# Patient Record
Sex: FEMALE | Race: WHITE | NOT HISPANIC OR LATINO | Employment: FULL TIME | URBAN - METROPOLITAN AREA
[De-identification: names, ages, dates, MRNs, and addresses within clinical notes are randomized per-mention and may not be internally consistent; named-entity substitution may affect disease eponyms.]

---

## 2017-02-06 ENCOUNTER — HOSPITAL ENCOUNTER (EMERGENCY)
Facility: HOSPITAL | Age: 27
Discharge: HOME | End: 2017-02-06
Attending: EMERGENCY MEDICINE | Admitting: EMERGENCY MEDICINE

## 2017-02-06 VITALS
OXYGEN SATURATION: 96 % | SYSTOLIC BLOOD PRESSURE: 130 MMHG | WEIGHT: 120 LBS | TEMPERATURE: 98.4 F | HEART RATE: 111 BPM | HEIGHT: 63 IN | RESPIRATION RATE: 20 BRPM | BODY MASS INDEX: 21.26 KG/M2 | DIASTOLIC BLOOD PRESSURE: 73 MMHG

## 2017-02-06 DIAGNOSIS — R21 RASH: Primary | ICD-10-CM

## 2017-02-06 PROCEDURE — 99283 EMERGENCY DEPT VISIT LOW MDM: CPT
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
## 2017-02-06 NOTE — DISCHARGE INSTRUCTIONS
Follow up with one of the [facility] physicians below to setup primary care.    (Dr. Sifuentes, Dr. Gonzalez, Dr. Meyer, and Dr. Hughes.)  [facility], Primary Care, [phone], [address], [place] [address]    [facility], Primary Care, [phone], [address] [place], [address] [place]     ([place]) [facility], Primary Care, [phone], [address], [address] [place], [address] [place]     ([facility]) [facility], Primary Care, [phone], [address], [address] [place], [address]     [place] [address] [facility], Primary Care, [phone], [address] [place], [address]    [place] [address] [facility], Primary Care, [phone], [address] [place]    [place], [address] [facility], Primary Care, [phone], [address], [address] [place], [address] [place]    ([facility], Primary Care, [phone], [address] [place], [address] [facility]) [facility], Primary Care, [phone], [address] [place], [address] [facility], Primary Care, [phone], [address], [address] [place], [address] [facility], Primary Care, [phone], [address], [place], [address]     [facility], Primary Care, [phone], [address] [place] [address] [place], [address]

## 2017-11-28 ENCOUNTER — HOSPITAL ENCOUNTER (EMERGENCY)
Facility: HOSPITAL | Age: 27
Discharge: HOME | End: 2017-11-28
Attending: EMERGENCY MEDICINE | Admitting: EMERGENCY MEDICINE

## 2017-11-28 ENCOUNTER — APPOINTMENT (OUTPATIENT)
Dept: GENERAL RADIOLOGY | Facility: HOSPITAL | Age: 27
End: 2017-11-28

## 2017-11-28 VITALS
TEMPERATURE: 98.2 F | RESPIRATION RATE: 16 BRPM | DIASTOLIC BLOOD PRESSURE: 76 MMHG | WEIGHT: 162 LBS | SYSTOLIC BLOOD PRESSURE: 116 MMHG | HEIGHT: 63 IN | OXYGEN SATURATION: 100 % | HEART RATE: 95 BPM | BODY MASS INDEX: 28.7 KG/M2

## 2017-11-28 DIAGNOSIS — S86.912A KNEE STRAIN, LEFT, INITIAL ENCOUNTER: Primary | ICD-10-CM

## 2017-11-28 PROCEDURE — 73562 X-RAY EXAM OF KNEE 3: CPT

## 2017-11-28 PROCEDURE — 99283 EMERGENCY DEPT VISIT LOW MDM: CPT

## 2017-11-28 RX ORDER — ACETAMINOPHEN 325 MG/1
650 TABLET ORAL ONCE
Status: COMPLETED | OUTPATIENT
Start: 2017-11-28 | End: 2017-11-28

## 2017-11-28 RX ORDER — IBUPROFEN 600 MG/1
600 TABLET ORAL EVERY 8 HOURS PRN
Qty: 12 TABLET | Refills: 0 | Status: SHIPPED | OUTPATIENT
Start: 2017-11-28

## 2017-11-28 RX ORDER — SENNOSIDES 8.6 MG
650 CAPSULE ORAL EVERY 8 HOURS PRN
Qty: 12 TABLET | Refills: 0 | Status: SHIPPED | OUTPATIENT
Start: 2017-11-28 | End: 2021-12-29

## 2017-11-28 RX ADMIN — ACETAMINOPHEN 650 MG: 325 TABLET, FILM COATED ORAL at 11:22

## 2020-01-27 ENCOUNTER — APPOINTMENT (OUTPATIENT)
Dept: GENERAL RADIOLOGY | Facility: HOSPITAL | Age: 30
End: 2020-01-27

## 2020-01-27 ENCOUNTER — APPOINTMENT (OUTPATIENT)
Dept: CT IMAGING | Facility: HOSPITAL | Age: 30
End: 2020-01-27

## 2020-01-27 ENCOUNTER — HOSPITAL ENCOUNTER (EMERGENCY)
Facility: HOSPITAL | Age: 30
Discharge: HOME | End: 2020-01-27
Attending: EMERGENCY MEDICINE | Admitting: EMERGENCY MEDICINE

## 2020-01-27 ENCOUNTER — APPOINTMENT (OUTPATIENT)
Dept: CARDIOLOGY | Facility: HOSPITAL | Age: 30
End: 2020-01-27

## 2020-01-27 VITALS
HEIGHT: 64 IN | SYSTOLIC BLOOD PRESSURE: 111 MMHG | DIASTOLIC BLOOD PRESSURE: 74 MMHG | RESPIRATION RATE: 16 BRPM | TEMPERATURE: 99.2 F | HEART RATE: 70 BPM | BODY MASS INDEX: 30.73 KG/M2 | WEIGHT: 180 LBS | OXYGEN SATURATION: 99 %

## 2020-01-27 DIAGNOSIS — M79.605 LEFT LEG PAIN: ICD-10-CM

## 2020-01-27 DIAGNOSIS — R91.1 PULMONARY NODULE: ICD-10-CM

## 2020-01-27 DIAGNOSIS — R07.9 ACUTE CHEST PAIN: Primary | ICD-10-CM

## 2020-01-27 LAB
ALBUMIN SERPL-MCNC: 4.1 G/DL (ref 3.5–5.2)
ALBUMIN/GLOB SERPL: 1.2 G/DL
ALP SERPL-CCNC: 53 U/L (ref 39–117)
ALT SERPL W P-5'-P-CCNC: 9 U/L (ref 1–33)
ANION GAP SERPL CALCULATED.3IONS-SCNC: 12 MMOL/L (ref 5–15)
AST SERPL-CCNC: 10 U/L (ref 1–32)
B-HCG UR QL: NEGATIVE
BASOPHILS # BLD AUTO: 0.06 10*3/MM3 (ref 0–0.2)
BASOPHILS NFR BLD AUTO: 0.8 % (ref 0–1.5)
BH CV ECHO MEAS - BSA(HAYCOCK): 1.9 M^2
BH CV ECHO MEAS - BSA: 1.9 M^2
BH CV ECHO MEAS - BZI_BMI: 30.9 KILOGRAMS/M^2
BH CV ECHO MEAS - BZI_METRIC_HEIGHT: 162.6 CM
BH CV ECHO MEAS - BZI_METRIC_WEIGHT: 81.6 KG
BH CV LOWER VASCULAR LEFT COMMON FEMORAL AUGMENT: NORMAL
BH CV LOWER VASCULAR LEFT COMMON FEMORAL COMPRESS: NORMAL
BH CV LOWER VASCULAR LEFT COMMON FEMORAL PHASIC: NORMAL
BH CV LOWER VASCULAR LEFT COMMON FEMORAL SPONT: NORMAL
BH CV LOWER VASCULAR LEFT DISTAL FEMORAL COMPRESS: NORMAL
BH CV LOWER VASCULAR LEFT GASTRONEMIUS COMPRESS: NORMAL
BH CV LOWER VASCULAR LEFT GREATER SAPH AK COMPRESS: NORMAL
BH CV LOWER VASCULAR LEFT LESSER SAPH COMPRESS: NORMAL
BH CV LOWER VASCULAR LEFT MID FEMORAL AUGMENT: NORMAL
BH CV LOWER VASCULAR LEFT MID FEMORAL COMPRESS: NORMAL
BH CV LOWER VASCULAR LEFT MID FEMORAL PHASIC: NORMAL
BH CV LOWER VASCULAR LEFT MID FEMORAL SPONT: NORMAL
BH CV LOWER VASCULAR LEFT PERONEAL COMPRESS: NORMAL
BH CV LOWER VASCULAR LEFT POPLITEAL AUGMENT: NORMAL
BH CV LOWER VASCULAR LEFT POPLITEAL COMPRESS: NORMAL
BH CV LOWER VASCULAR LEFT POPLITEAL PHASIC: NORMAL
BH CV LOWER VASCULAR LEFT POPLITEAL SPONT: NORMAL
BH CV LOWER VASCULAR LEFT POSTERIOR TIBIAL COMPRESS: NORMAL
BH CV LOWER VASCULAR LEFT PROFUNDA FEMORAL AUGMENT: NORMAL
BH CV LOWER VASCULAR LEFT PROFUNDA FEMORAL COMPRESS: NORMAL
BH CV LOWER VASCULAR LEFT PROFUNDA FEMORAL PHASIC: NORMAL
BH CV LOWER VASCULAR LEFT PROFUNDA FEMORAL SPONT: NORMAL
BH CV LOWER VASCULAR LEFT PROXIMAL FEMORAL COMPRESS: NORMAL
BH CV LOWER VASCULAR LEFT SAPHENOFEMORAL JUNCTION AUGMENT: NORMAL
BH CV LOWER VASCULAR LEFT SAPHENOFEMORAL JUNCTION COMPRESS: NORMAL
BH CV LOWER VASCULAR LEFT SAPHENOFEMORAL JUNCTION PHASIC: NORMAL
BH CV LOWER VASCULAR LEFT SAPHENOFEMORAL JUNCTION SPONT: NORMAL
BH CV LOWER VASCULAR RIGHT COMMON FEMORAL AUGMENT: NORMAL
BH CV LOWER VASCULAR RIGHT COMMON FEMORAL COMPRESS: NORMAL
BH CV LOWER VASCULAR RIGHT COMMON FEMORAL PHASIC: NORMAL
BH CV LOWER VASCULAR RIGHT COMMON FEMORAL SPONT: NORMAL
BILIRUB SERPL-MCNC: 0.2 MG/DL (ref 0.2–1.2)
BILIRUB UR QL STRIP: NEGATIVE
BUN BLD-MCNC: 16 MG/DL (ref 6–20)
BUN/CREAT SERPL: 27.1 (ref 7–25)
CALCIUM SPEC-SCNC: 9.3 MG/DL (ref 8.6–10.5)
CHLORIDE SERPL-SCNC: 104 MMOL/L (ref 98–107)
CLARITY UR: CLEAR
CO2 SERPL-SCNC: 23 MMOL/L (ref 22–29)
COLOR UR: YELLOW
CREAT BLD-MCNC: 0.59 MG/DL (ref 0.57–1)
DEPRECATED RDW RBC AUTO: 38.8 FL (ref 37–54)
EOSINOPHIL # BLD AUTO: 0.09 10*3/MM3 (ref 0–0.4)
EOSINOPHIL NFR BLD AUTO: 1.3 % (ref 0.3–6.2)
ERYTHROCYTE [DISTWIDTH] IN BLOOD BY AUTOMATED COUNT: 11.8 % (ref 12.3–15.4)
GFR SERPL CREATININE-BSD FRML MDRD: 121 ML/MIN/1.73
GLOBULIN UR ELPH-MCNC: 3.3 GM/DL
GLUCOSE BLD-MCNC: 115 MG/DL (ref 65–99)
GLUCOSE UR STRIP-MCNC: NEGATIVE MG/DL
HCT VFR BLD AUTO: 38.3 % (ref 34–46.6)
HGB BLD-MCNC: 12.7 G/DL (ref 12–15.9)
HGB UR QL STRIP.AUTO: NEGATIVE
HOLD SPECIMEN: NORMAL
HOLD SPECIMEN: NORMAL
IMM GRANULOCYTES # BLD AUTO: 0.02 10*3/MM3 (ref 0–0.05)
IMM GRANULOCYTES NFR BLD AUTO: 0.3 % (ref 0–0.5)
INTERNAL NEGATIVE CONTROL: NEGATIVE
INTERNAL POSITIVE CONTROL: POSITIVE
KETONES UR QL STRIP: NEGATIVE
LEUKOCYTE ESTERASE UR QL STRIP.AUTO: NEGATIVE
LYMPHOCYTES # BLD AUTO: 2.45 10*3/MM3 (ref 0.7–3.1)
LYMPHOCYTES NFR BLD AUTO: 34.7 % (ref 19.6–45.3)
Lab: NORMAL
MCH RBC QN AUTO: 30 PG (ref 26.6–33)
MCHC RBC AUTO-ENTMCNC: 33.2 G/DL (ref 31.5–35.7)
MCV RBC AUTO: 90.5 FL (ref 79–97)
MONOCYTES # BLD AUTO: 0.36 10*3/MM3 (ref 0.1–0.9)
MONOCYTES NFR BLD AUTO: 5.1 % (ref 5–12)
NEUTROPHILS # BLD AUTO: 4.09 10*3/MM3 (ref 1.7–7)
NEUTROPHILS NFR BLD AUTO: 57.8 % (ref 42.7–76)
NITRITE UR QL STRIP: NEGATIVE
NRBC BLD AUTO-RTO: 0 /100 WBC (ref 0–0.2)
PH UR STRIP.AUTO: 5.5 [PH] (ref 5–8)
PLATELET # BLD AUTO: 275 10*3/MM3 (ref 140–450)
PMV BLD AUTO: 10.1 FL (ref 6–12)
POTASSIUM BLD-SCNC: 4.2 MMOL/L (ref 3.5–5.2)
PROT SERPL-MCNC: 7.4 G/DL (ref 6–8.5)
PROT UR QL STRIP: NEGATIVE
RBC # BLD AUTO: 4.23 10*6/MM3 (ref 3.77–5.28)
SODIUM BLD-SCNC: 139 MMOL/L (ref 136–145)
SP GR UR STRIP: 1.04 (ref 1–1.03)
TROPONIN T SERPL-MCNC: <0.01 NG/ML (ref 0–0.03)
TROPONIN T SERPL-MCNC: <0.01 NG/ML (ref 0–0.03)
UROBILINOGEN UR QL STRIP: ABNORMAL
WBC NRBC COR # BLD: 7.07 10*3/MM3 (ref 3.4–10.8)
WHOLE BLOOD HOLD SPECIMEN: NORMAL
WHOLE BLOOD HOLD SPECIMEN: NORMAL

## 2020-01-27 PROCEDURE — 99284 EMERGENCY DEPT VISIT MOD MDM: CPT

## 2020-01-27 PROCEDURE — 80053 COMPREHEN METABOLIC PANEL: CPT | Performed by: EMERGENCY MEDICINE

## 2020-01-27 PROCEDURE — 93971 EXTREMITY STUDY: CPT

## 2020-01-27 PROCEDURE — 85025 COMPLETE CBC W/AUTO DIFF WBC: CPT

## 2020-01-27 PROCEDURE — 93971 EXTREMITY STUDY: CPT | Performed by: INTERNAL MEDICINE

## 2020-01-27 PROCEDURE — 81025 URINE PREGNANCY TEST: CPT | Performed by: NURSE PRACTITIONER

## 2020-01-27 PROCEDURE — 84484 ASSAY OF TROPONIN QUANT: CPT | Performed by: NURSE PRACTITIONER

## 2020-01-27 PROCEDURE — 93005 ELECTROCARDIOGRAM TRACING: CPT

## 2020-01-27 PROCEDURE — 81003 URINALYSIS AUTO W/O SCOPE: CPT | Performed by: NURSE PRACTITIONER

## 2020-01-27 PROCEDURE — 93005 ELECTROCARDIOGRAM TRACING: CPT | Performed by: NURSE PRACTITIONER

## 2020-01-27 PROCEDURE — 73560 X-RAY EXAM OF KNEE 1 OR 2: CPT

## 2020-01-27 PROCEDURE — 84484 ASSAY OF TROPONIN QUANT: CPT | Performed by: EMERGENCY MEDICINE

## 2020-01-27 PROCEDURE — 93005 ELECTROCARDIOGRAM TRACING: CPT | Performed by: EMERGENCY MEDICINE

## 2020-01-27 PROCEDURE — 71275 CT ANGIOGRAPHY CHEST: CPT

## 2020-01-27 PROCEDURE — 0 IOPAMIDOL PER 1 ML: Performed by: EMERGENCY MEDICINE

## 2020-01-27 RX ORDER — NORGESTIMATE AND ETHINYL ESTRADIOL 0.25-0.035
1 KIT ORAL DAILY
COMMUNITY
End: 2021-12-29

## 2020-01-27 RX ORDER — ALUMINA, MAGNESIA, AND SIMETHICONE 2400; 2400; 240 MG/30ML; MG/30ML; MG/30ML
15 SUSPENSION ORAL ONCE
Status: COMPLETED | OUTPATIENT
Start: 2020-01-27 | End: 2020-01-27

## 2020-01-27 RX ORDER — SODIUM CHLORIDE 0.9 % (FLUSH) 0.9 %
10 SYRINGE (ML) INJECTION AS NEEDED
Status: DISCONTINUED | OUTPATIENT
Start: 2020-01-27 | End: 2020-01-27 | Stop reason: HOSPADM

## 2020-01-27 RX ORDER — LIDOCAINE HYDROCHLORIDE 20 MG/ML
15 SOLUTION OROPHARYNGEAL ONCE
Status: COMPLETED | OUTPATIENT
Start: 2020-01-27 | End: 2020-01-27

## 2020-01-27 RX ADMIN — SODIUM CHLORIDE, PRESERVATIVE FREE 10 ML: 5 INJECTION INTRAVENOUS at 10:42

## 2020-01-27 RX ADMIN — ALUMINUM HYDROXIDE, MAGNESIUM HYDROXIDE, AND DIMETHICONE 15 ML: 400; 400; 40 SUSPENSION ORAL at 15:19

## 2020-01-27 RX ADMIN — IOPAMIDOL 75 ML: 755 INJECTION, SOLUTION INTRAVENOUS at 12:39

## 2020-01-27 RX ADMIN — LIDOCAINE HYDROCHLORIDE 15 ML: 20 SOLUTION ORAL; TOPICAL at 15:19
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
## 2020-01-27 NOTE — DISCHARGE INSTRUCTIONS
Follow up with one of the [facility] Primary Care Providers below to setup primary care. If you need assistance coordinating a primary care appointment with a [facility] Primary Care Provider, please contact the Primary Care Coordinators at [phone] for appointment scheduling.    [facility], Primary Care   [address], [address]   [place], [place] [address] [phone]    [facility] Internal Medicine & Endocrinology  [address]  [place] [address] [phone]    [facility] Family Medicine  [address]   [place], [place] [address] [phone]    [facility] Primary Care  [address]  [place], [place] [address] [phone]    [facility], Primary Care,   [address]   [place], [place] [address] [phone]    [facility] Primary Care  [address]  [place], [place] [address] [phone]    [facility], Primary Care  [address], [address]  [place], [place] [address] [phone]    [facility] Internal Medicine & Pediatrics  [address]   [place] [address] [phone]    [facility], Primary Care  [address] [place] [address] [phone]      [facility] Primary Care  [address], [address]   [place] [address] [phone]    [facility] Family [facility]  [address] [place] [address] [phone]    Follow up with one of the physician centers below to setup primary care.    [facility], [phone], [address], [address], [place], [facility]    [facility], [phone], [address]  [address]    [facility], [phone], [address] [place] [address];     [facility], [phone], [address] [place] [address]

## 2020-01-30 ENCOUNTER — HOSPITAL ENCOUNTER (OUTPATIENT)
Dept: CARDIOLOGY | Facility: HOSPITAL | Age: 30
Discharge: HOME | End: 2020-01-30
Admitting: NURSE PRACTITIONER

## 2020-01-30 ENCOUNTER — OFFICE VISIT (OUTPATIENT)
Dept: CARDIOLOGY | Facility: HOSPITAL | Age: 30
End: 2020-01-30

## 2020-01-30 VITALS
OXYGEN SATURATION: 98 % | WEIGHT: 200.25 LBS | HEART RATE: 76 BPM | HEIGHT: 64 IN | RESPIRATION RATE: 19 BRPM | DIASTOLIC BLOOD PRESSURE: 70 MMHG | BODY MASS INDEX: 34.19 KG/M2 | SYSTOLIC BLOOD PRESSURE: 116 MMHG | TEMPERATURE: 97.6 F

## 2020-01-30 VITALS — BODY MASS INDEX: 34.15 KG/M2 | HEIGHT: 64 IN | WEIGHT: 200 LBS

## 2020-01-30 DIAGNOSIS — R06.09 DOE (DYSPNEA ON EXERTION): Primary | ICD-10-CM

## 2020-01-30 DIAGNOSIS — I31.39 PERICARDIAL EFFUSION: ICD-10-CM

## 2020-01-30 DIAGNOSIS — R06.09 DOE (DYSPNEA ON EXERTION): ICD-10-CM

## 2020-01-30 LAB
BH CV ECHO MEAS - AO ROOT AREA (BSA CORRECTED): 1.3
BH CV ECHO MEAS - AO ROOT AREA: 5 CM^2
BH CV ECHO MEAS - AO ROOT DIAM: 2.5 CM
BH CV ECHO MEAS - BSA(HAYCOCK): 2.1 M^2
BH CV ECHO MEAS - BSA: 2 M^2
BH CV ECHO MEAS - BZI_BMI: 34.3 KILOGRAMS/M^2
BH CV ECHO MEAS - BZI_METRIC_HEIGHT: 162.6 CM
BH CV ECHO MEAS - BZI_METRIC_WEIGHT: 90.7 KG
BH CV ECHO MEAS - EDV(CUBED): 61.9 ML
BH CV ECHO MEAS - EDV(MOD-SP2): 140 ML
BH CV ECHO MEAS - EDV(MOD-SP4): 134 ML
BH CV ECHO MEAS - EDV(TEICH): 68.2 ML
BH CV ECHO MEAS - EF(CUBED): 70.1 %
BH CV ECHO MEAS - EF(MOD-SP2): 53.6 %
BH CV ECHO MEAS - EF(MOD-SP4): 51.5 %
BH CV ECHO MEAS - EF(TEICH): 62.3 %
BH CV ECHO MEAS - ESV(CUBED): 18.5 ML
BH CV ECHO MEAS - ESV(MOD-SP2): 65 ML
BH CV ECHO MEAS - ESV(MOD-SP4): 65 ML
BH CV ECHO MEAS - ESV(TEICH): 25.7 ML
BH CV ECHO MEAS - FS: 33.1 %
BH CV ECHO MEAS - IVS/LVPW: 0.97
BH CV ECHO MEAS - IVSD: 1.1 CM
BH CV ECHO MEAS - LA DIMENSION: 3.8 CM
BH CV ECHO MEAS - LA/AO: 1.5
BH CV ECHO MEAS - LAD MAJOR: 4.6 CM
BH CV ECHO MEAS - LAT PEAK E' VEL: 12.3 CM/SEC
BH CV ECHO MEAS - LATERAL E/E' RATIO: 5.3
BH CV ECHO MEAS - LV DIASTOLIC VOL/BSA (35-75): 68.5 ML/M^2
BH CV ECHO MEAS - LV MASS(C)D: 137.4 GRAMS
BH CV ECHO MEAS - LV MASS(C)DI: 70.2 GRAMS/M^2
BH CV ECHO MEAS - LV MAX PG: 4.5 MMHG
BH CV ECHO MEAS - LV MEAN PG: 2.1 MMHG
BH CV ECHO MEAS - LV SYSTOLIC VOL/BSA (12-30): 33.2 ML/M^2
BH CV ECHO MEAS - LV V1 MAX: 105.6 CM/SEC
BH CV ECHO MEAS - LV V1 MEAN: 65.8 CM/SEC
BH CV ECHO MEAS - LV V1 VTI: 23.2 CM
BH CV ECHO MEAS - LVIDD: 4 CM
BH CV ECHO MEAS - LVIDS: 2.6 CM
BH CV ECHO MEAS - LVLD AP2: 9.7 CM
BH CV ECHO MEAS - LVLD AP4: 9.1 CM
BH CV ECHO MEAS - LVLS AP2: 8 CM
BH CV ECHO MEAS - LVLS AP4: 8.5 CM
BH CV ECHO MEAS - LVOT AREA (M): 3.1 CM^2
BH CV ECHO MEAS - LVOT AREA: 3 CM^2
BH CV ECHO MEAS - LVOT DIAM: 2 CM
BH CV ECHO MEAS - LVPWD: 1.1 CM
BH CV ECHO MEAS - MED PEAK E' VEL: 8.9 CM/SEC
BH CV ECHO MEAS - MEDIAL E/E' RATIO: 7.3
BH CV ECHO MEAS - MV A MAX VEL: 61.7 CM/SEC
BH CV ECHO MEAS - MV DEC TIME: 0.17 SEC
BH CV ECHO MEAS - MV E MAX VEL: 65.6 CM/SEC
BH CV ECHO MEAS - MV E/A: 1.1
BH CV ECHO MEAS - PA ACC SLOPE: 1018 CM/SEC^2
BH CV ECHO MEAS - PA ACC TIME: 0.07 SEC
BH CV ECHO MEAS - PA PR(ACCEL): 48.9 MMHG
BH CV ECHO MEAS - RVDD: 2.4 CM
BH CV ECHO MEAS - SI(CUBED): 22.2 ML/M^2
BH CV ECHO MEAS - SI(LVOT): 35.9 ML/M^2
BH CV ECHO MEAS - SI(MOD-SP2): 38.3 ML/M^2
BH CV ECHO MEAS - SI(MOD-SP4): 35.3 ML/M^2
BH CV ECHO MEAS - SI(TEICH): 21.7 ML/M^2
BH CV ECHO MEAS - SV(CUBED): 43.4 ML
BH CV ECHO MEAS - SV(LVOT): 70.3 ML
BH CV ECHO MEAS - SV(MOD-SP2): 75 ML
BH CV ECHO MEAS - SV(MOD-SP4): 69 ML
BH CV ECHO MEAS - SV(TEICH): 42.5 ML
BH CV ECHO MEAS - TAPSE (>1.6): 2.4 CM2
BH CV ECHO MEASUREMENTS AVERAGE E/E' RATIO: 6.19
BH CV VAS BP LEFT ARM: NORMAL MMHG
BH CV XLRA - RV BASE: 4 CM
BH CV XLRA - RV LENGTH: 6 CM
BH CV XLRA - RV MID: 2.8 CM
BH CV XLRA - TDI S': 13.6 CM/SEC
LEFT ATRIUM VOLUME INDEX: 17.4 ML/M^2
LEFT ATRIUM VOLUME: 34 ML
MAXIMAL PREDICTED HEART RATE: 191 BPM
STRESS TARGET HR: 162 BPM

## 2020-01-30 PROCEDURE — 93306 TTE W/DOPPLER COMPLETE: CPT | Performed by: INTERNAL MEDICINE

## 2020-01-30 PROCEDURE — 99203 OFFICE O/P NEW LOW 30 MIN: CPT | Performed by: NURSE PRACTITIONER

## 2020-01-30 PROCEDURE — 93306 TTE W/DOPPLER COMPLETE: CPT

## 2020-01-31 ENCOUNTER — TELEPHONE (OUTPATIENT)
Dept: CARDIOLOGY | Facility: HOSPITAL | Age: 30
End: 2020-01-31

## 2020-01-31 NOTE — TELEPHONE ENCOUNTER
Patient called inquiring about her Echo results. She does not get off work till 5:30 but states she will turn her ringer up.

## 2020-02-03 ENCOUNTER — TELEPHONE (OUTPATIENT)
Dept: CARDIOLOGY | Facility: HOSPITAL | Age: 30
End: 2020-02-03

## 2020-02-03 NOTE — TELEPHONE ENCOUNTER
Patient called in wanting to know results of echo.  Advised pt that Provider ELENITA Sharpe was out of the office today.  Spoke with Provider ELENITA Rosario and per her to relay to patient that the echo looked normal. Patient verbalized understanding and would like to speak with provider for the next step.  She stated the best time to reach her is before 9 am as she can not answer her phone during work hours.

## 2020-02-04 ENCOUNTER — TELEPHONE (OUTPATIENT)
Dept: CARDIOLOGY | Facility: HOSPITAL | Age: 30
End: 2020-02-04

## 2020-02-04 PROBLEM — R06.09 DOE (DYSPNEA ON EXERTION): Status: ACTIVE | Noted: 2020-02-04

## 2020-02-04 PROBLEM — I31.39 PERICARDIAL EFFUSION: Status: ACTIVE | Noted: 2020-02-04

## 2020-02-04 RX ORDER — COLCHICINE 0.6 MG/1
0.6 TABLET ORAL 2 TIMES DAILY
Qty: 28 TABLET | Refills: 0 | Status: SHIPPED | OUTPATIENT
Start: 2020-02-04 | End: 2021-12-29

## 2020-02-04 NOTE — TELEPHONE ENCOUNTER
Spoke with patient and reviewed echo. No pericardial fluid noted. Suspect pericarditis. Will begin colchine 0.6 mg bid for 2 weeks. Patient will receive a follow up call from [facility] on 2/7 to evaluate s/s.

## 2020-02-05 ENCOUNTER — DOCUMENTATION (OUTPATIENT)
Dept: CARDIOLOGY | Facility: HOSPITAL | Age: 30
End: 2020-02-05

## 2020-02-05 NOTE — PROGRESS NOTES
Patient called stated that she needed a prior authorization for colchicine 0.6 mg bid. Spoke to Rafaela (pharmacist) and [facility]     Called patient back told her she should be able to get the meds when she could. She had no further questions or concerns at this time.

## 2020-02-07 ENCOUNTER — TELEPHONE (OUTPATIENT)
Dept: CARDIOLOGY | Facility: HOSPITAL | Age: 30
End: 2020-02-07

## 2020-02-07 NOTE — TELEPHONE ENCOUNTER
Spoke with patient who notes that she took first dose of colchine today. Patient reports that she has not had chest pain in 2 days.

## 2020-02-18 RX ORDER — COLCHICINE 0.6 MG/1
TABLET ORAL
Qty: 28 TABLET | Refills: 0 | OUTPATIENT
Start: 2020-02-18

## 2020-08-11 ENCOUNTER — LAB REQUISITION (OUTPATIENT)
Dept: LAB | Facility: HOSPITAL | Age: 30
End: 2020-08-11

## 2020-08-11 DIAGNOSIS — Z00.00 ROUTINE GENERAL MEDICAL EXAMINATION AT A HEALTH CARE FACILITY: ICD-10-CM

## 2020-08-11 PROCEDURE — 86481 TB AG RESPONSE T-CELL SUSP: CPT

## 2020-08-28 LAB
HBV SURFACE AB SER RIA-ACNC: NORMAL
MEV IGG SER IA-ACNC: NORMAL
MUV IGG SER IA-ACNC: NORMAL
RUBV IGG SERPL IA-ACNC: NORMAL
TSPOT INTERPRETATION: NORMAL
TSPOT NIL CONTROL INTERPRETATION: NORMAL
TSPOT PANEL A: NORMAL
TSPOT PANEL B: NORMAL
TSPOT POS CONTROL INTERPRETATION: NORMAL
VZV IGG SER IA-ACNC: NORMAL

## 2021-12-29 ENCOUNTER — OFFICE VISIT (OUTPATIENT)
Dept: INTERNAL MEDICINE | Facility: CLINIC | Age: 31
End: 2021-12-29

## 2021-12-29 VITALS
DIASTOLIC BLOOD PRESSURE: 80 MMHG | HEIGHT: 64 IN | HEART RATE: 69 BPM | RESPIRATION RATE: 16 BRPM | OXYGEN SATURATION: 97 % | BODY MASS INDEX: 30.97 KG/M2 | SYSTOLIC BLOOD PRESSURE: 128 MMHG | TEMPERATURE: 97.6 F | WEIGHT: 181.4 LBS

## 2021-12-29 DIAGNOSIS — Z13.220 SCREENING, LIPID: ICD-10-CM

## 2021-12-29 DIAGNOSIS — Z13.29 SCREENING FOR ENDOCRINE DISORDER: ICD-10-CM

## 2021-12-29 DIAGNOSIS — R03.0 ELEVATED BP WITHOUT DIAGNOSIS OF HYPERTENSION: ICD-10-CM

## 2021-12-29 DIAGNOSIS — G89.29 CHRONIC PAIN OF LEFT KNEE: Primary | ICD-10-CM

## 2021-12-29 DIAGNOSIS — M25.562 CHRONIC PAIN OF LEFT KNEE: Primary | ICD-10-CM

## 2021-12-29 PROCEDURE — 99214 OFFICE O/P EST MOD 30 MIN: CPT | Performed by: PHYSICIAN ASSISTANT

## 2021-12-29 RX ORDER — NICOTINE POLACRILEX 2 MG
1 GUM BUCCAL DAILY
COMMUNITY

## 2021-12-29 RX ORDER — ACETAMINOPHEN AND CODEINE PHOSPHATE 120; 12 MG/5ML; MG/5ML
0.35 SOLUTION ORAL DAILY
COMMUNITY

## 2021-12-29 RX ORDER — MELATONIN
1000 DAILY
COMMUNITY
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
## 2022-03-09 ENCOUNTER — OFFICE VISIT (OUTPATIENT)
Dept: ORTHOPEDIC SURGERY | Facility: CLINIC | Age: 32
End: 2022-03-09

## 2022-03-09 VITALS
HEIGHT: 64 IN | WEIGHT: 180 LBS | BODY MASS INDEX: 30.73 KG/M2 | DIASTOLIC BLOOD PRESSURE: 82 MMHG | SYSTOLIC BLOOD PRESSURE: 122 MMHG

## 2022-03-09 DIAGNOSIS — M17.32 POST-TRAUMATIC OSTEOARTHRITIS OF LEFT KNEE: Primary | ICD-10-CM

## 2022-03-09 PROCEDURE — 99203 OFFICE O/P NEW LOW 30 MIN: CPT | Performed by: ORTHOPAEDIC SURGERY

## 2022-03-09 NOTE — PROGRESS NOTES
[facility] Note    Subjective     Chief Complaint   Patient presents with   • Left Knee - Pain        HPI    Louise Colon is a 31 y.o. female who presents with new problem of: left knee pain.  Onset: . The issue has been ongoing for 8 year(s). Pain is a 5/10 on the pain scale. Pain is described as aching. Associated symptoms include swelling and popping. The pain is worse with walking, standing and climbing stairs; resting improve the pain. Previous treatments have included: bracing, NSAIDS and weight loss.  History of PCL reconstruction and MCL repair in 2014, she cannot remember who did the surgery, but was here in [place].  Intermittent fullness sensation in the posterior aspect of her knee, not currently present.    I have reviewed the following portions of the patient's history and agree with: History of Present Illness and Review of Systems    Patient Active Problem List   Diagnosis   • FUNG (dyspnea on exertion)   • Pericardial effusion   • Elevated BP without diagnosis of hypertension   • Chronic pain of left knee     Past Medical History:   Diagnosis Date   • Anxiety    • Arthritis    • Depression    • Headache       Past Surgical History:   Procedure Laterality Date   • FEMUR FRACTURE SURGERY     • KNEE SURGERY     • MANDIBLE SURGERY        Family History   Problem Relation Age of Onset   • No Known Problems Mother    • No Known Problems Father    • No Known Problems Sister    • No Known Problems Brother    • Diabetes Maternal Grandmother    • Hypertension Maternal Grandmother    • Diabetes Maternal Grandfather    • Hypertension Maternal Grandfather    • Hypertension Paternal Grandmother    • Hypertension Paternal Grandfather      Social History     Socioeconomic History   • Marital status: Single   Tobacco Use   • Smoking status: Former Smoker     Packs/day: 0.25     Types: Cigarettes   • Smokeless tobacco: Never Used   • Tobacco comment: (12/29/2021) Quit date of 4/27/2021  "  Vaping Use   • Vaping Use: Never used   Substance and Sexual Activity   • Alcohol use: No   • Drug use: Not Currently     Types: Cocaine(coke), Marijuana     Comment: previous heroin use; pt reports hx of   • Sexual activity: Defer      Current Outpatient Medications on File Prior to Visit   Medication Sig Dispense Refill   • Biotin 1 MG capsule Take 1 mg by mouth Daily.     • cholecalciferol (VITAMIN D3) 25 MCG (1000 UT) tablet Take 1,000 Units by mouth Daily.     • ibuprofen (ADVIL,MOTRIN) 600 MG tablet Take 1 tablet by mouth Every 8 (Eight) Hours As Needed for Mild Pain . 12 tablet 0   • norethindrone (MICRONOR) 0.35 MG tablet Take 0.35 mg by mouth Daily.     • Zinc 50 MG capsule Take  by mouth.       No current facility-administered medications on file prior to visit.      No Known Allergies     Review of Systems   Constitutional: Negative.    Eyes: Negative.    Respiratory: Negative.    Cardiovascular: Positive for leg swelling.   Gastrointestinal: Negative.    Endocrine: Negative.    Genitourinary: Negative.    Musculoskeletal: Positive for arthralgias, back pain and neck pain.   Skin: Negative.    Allergic/Immunologic: Negative.    Neurological: Positive for headaches.   Hematological: Negative.    Psychiatric/Behavioral: Negative.         Objective      Physical Exam  /82   Ht 162.6 cm (64\")   Wt 81.6 kg (180 lb)   BMI 30.90 kg/m²     Body mass index is 30.9 kg/m².    General:   Mental Status:  Alert   Appearance: Cooperative, in no acute distress   Build and Nutrition: Well-nourished well-developed female   Orientation: Alert and oriented to person, place and time   Posture: Normal   Gait: Nonantalgic    Integument:   Left knee: Old medial incision well-healed    Neurologic:   Sensation:    Left foot: Intact to light touch on the dorsal and plantar aspect   Motor:  Left lower extremity: 5/5 quadriceps, hamstrings, ankle dorsiflexors, and ankle plantar flexors  Vascular:   Left lower extremity: 2+ " dorsalis pedis pulse, prompt capillary refill    Lower Extremities:   Left Knee:    Tenderness:  None    Effusion:  None    Swelling:  None    Crepitus:  Positive    Atrophy:  None    Range of motion:  Extension: 0°       Flexion: 140°  Instability:  No varus laxity, no valgus laxity, negative anterior drawer negative posterior drawer negative Lachman's  Deformities:  None      Imaging/Studies      Imaging Results (Last 24 Hours)     Procedure Component Value Units Date/Time    XR Knee 3+ View With [place] Left [[id number]] Resulted: 03/09/22 1319     Updated: 03/09/22 1320    Narrative:      Left Knee Radiographs  Indication: left knee pain  Views: AP, lateral, and sunrise views of the left knee    Comparison: 1/27/2020    Findings:   Medial joint space narrowing, no acute bony abnormalities.  Residuals from   previous PCL reconstruction, with no unusual bony features.  Medial   anchors are also noted, suspicious for medial collateral ligament repair.    Mild patellofemoral degeneration.          Assessment and Plan     Diagnoses and all orders for this visit:    1. Post-traumatic osteoarthritis of left knee (Primary)  -     XR Knee 3+ View With [place] Left        1. Post-traumatic osteoarthritis of left knee        I reviewed my findings with the patient.  She does have some posttraumatic changes in the medial and patellofemoral joint, with a history of ACL reconstruction and MCL repair of her left knee in 2014.  Her knee is stable, and at this point I recommended conservative treatment.  I will see her back for any worsening or problems in the future.  Fullness sensation of posterior aspect her knee may be fluctuating Baker's cyst, and we discussed further imaging with MRI if it becomes more persistent or bothersome, and she is agreeable to that plan.  Over-the-counter anti-inflammatories as needed.    Return if symptoms worsen or fail to improve.      Tashi Yao MD  03/09/22  13:37 EST

## 2023-08-03 ENCOUNTER — HOSPITAL ENCOUNTER (OUTPATIENT)
Dept: GENERAL RADIOLOGY | Facility: HOSPITAL | Age: 33
Discharge: HOME | End: 2023-08-03
Admitting: PHYSICIAN ASSISTANT
Payer: COMMERCIAL

## 2023-08-03 ENCOUNTER — OFFICE VISIT (OUTPATIENT)
Dept: INTERNAL MEDICINE | Facility: CLINIC | Age: 33
End: 2023-08-03
Payer: COMMERCIAL

## 2023-08-03 VITALS
DIASTOLIC BLOOD PRESSURE: 72 MMHG | HEART RATE: 77 BPM | OXYGEN SATURATION: 98 % | SYSTOLIC BLOOD PRESSURE: 114 MMHG | RESPIRATION RATE: 20 BRPM | WEIGHT: 204 LBS | BODY MASS INDEX: 34.83 KG/M2 | HEIGHT: 64 IN | TEMPERATURE: 97.1 F

## 2023-08-03 DIAGNOSIS — M54.50 LUMBAR BACK PAIN: ICD-10-CM

## 2023-08-03 DIAGNOSIS — M54.50 LUMBAR BACK PAIN: Primary | ICD-10-CM

## 2023-08-03 DIAGNOSIS — E66.9 OBESITY (BMI 35.0-39.9 WITHOUT COMORBIDITY): ICD-10-CM

## 2023-08-03 DIAGNOSIS — G43.909 MIGRAINE WITHOUT STATUS MIGRAINOSUS, NOT INTRACTABLE, UNSPECIFIED MIGRAINE TYPE: ICD-10-CM

## 2023-08-03 PROCEDURE — 72100 X-RAY EXAM L-S SPINE 2/3 VWS: CPT

## 2023-08-03 PROCEDURE — 1159F MED LIST DOCD IN RCRD: CPT | Performed by: PHYSICIAN ASSISTANT

## 2023-08-03 PROCEDURE — 1160F RVW MEDS BY RX/DR IN RCRD: CPT | Performed by: PHYSICIAN ASSISTANT

## 2023-08-03 PROCEDURE — 99214 OFFICE O/P EST MOD 30 MIN: CPT | Performed by: PHYSICIAN ASSISTANT

## 2023-08-03 RX ORDER — NORETHINDRONE ACETATE AND ETHINYL ESTRADIOL 1MG-20(21)
KIT ORAL
COMMUNITY
Start: 2023-04-12

## 2023-08-03 RX ORDER — CYCLOBENZAPRINE HCL 5 MG
5-10 TABLET ORAL 2 TIMES DAILY PRN
Qty: 30 TABLET | Refills: 1 | Status: SHIPPED | OUTPATIENT
Start: 2023-08-03

## 2023-08-03 RX ORDER — SUMATRIPTAN 50 MG/1
TABLET, FILM COATED ORAL
Qty: 9 TABLET | Refills: 1 | Status: SHIPPED | OUTPATIENT
Start: 2023-08-03

## 2023-08-03 RX ORDER — PHENTERMINE HYDROCHLORIDE 37.5 MG/1
37.5 TABLET ORAL
Qty: 30 TABLET | Refills: 0 | Status: SHIPPED | OUTPATIENT
Start: 2023-08-03

## 2023-08-08 RX ORDER — TIRZEPATIDE 2.5 MG/.5ML
2.5 INJECTION, SOLUTION SUBCUTANEOUS WEEKLY
Qty: 2 ML | Refills: 0 | Status: SHIPPED | OUTPATIENT
Start: 2023-08-08

## 2023-08-09 RX ORDER — BUPROPION HYDROCHLORIDE 150 MG/1
TABLET, EXTENDED RELEASE ORAL
Qty: 60 TABLET | Refills: 2 | Status: SHIPPED | OUTPATIENT
Start: 2023-08-09

## 2023-08-11 ENCOUNTER — PRIOR AUTHORIZATION (OUTPATIENT)
Dept: INTERNAL MEDICINE | Facility: CLINIC | Age: 33
End: 2023-08-11
Payer: COMMERCIAL

## 2023-08-15 NOTE — TELEPHONE ENCOUNTER
PA DENIED -     This request has not been approved. Based on the information submitted for review, you did not meet our guideline rules for the requested drug. In order for your request to be approved, your provider would need to show that you have met the guideline rules below. The details below are written in medical language. If you have questions, please contact your provider. In some cases, the requested medication or alternatives offered may have additional approval requirements. Our guideline named GLP-1 RECEPTOR AGONISTS requires the following rule(s) be met for approval:A. One of the followin. The member has a diagnosis of type 2 diabetes [a disorder with high blood sugar] without chronic [long term] kidney disease and had tried and failed [drug did not work] metformin.2. The member has a diagnosis of type 2 diabetes [a disorder with high blood sugar] with chronic [long term] kidney disease and had a trial and therapeutic failure [drug did not work], allergy, contraindication [harmful for] (including potential drug-drug interactions with other medications) or intolerance [side effect] to concurrent metformin and SGLT2 inhibitor.3. The member has a diagnosis of type 2 diabetes [a disorder with high blood sugar] and atherosclerotic cardiovascular disease [a type of heart condition].4. The member has a diagnosis of type 2 diabetes [a disorder with high blood sugar] and heart failure with reduced ejection fraction [a type of heart condition] and had a trial and therapeutic failure [drug did not work], allergy, contraindication [harmful for] (including potential drug-drug interactions with other medications) or intolerance [side effect] to an SGLT2 inhibitor.B. The member has had at least a 3-month trial and therapeutic failure [drug did not work], allergy, contraindication [harmful for] (including potential drug-drug interactions with other medications) or intolerance [side effect] to a preferred GLP-1  agonist [drug in the same group]: Byetta, Ozempic, Victoza.Your doctor

## 2023-08-15 NOTE — TELEPHONE ENCOUNTER
Please let her know this med is not covered under her insurance, they state specifically she has to have a diagnosis of diabetes type 2 and failed multiple other medications for diabetes type 2 before they will cover it.

## 2023-08-18 LAB — DRUGS UR: NORMAL

## 2023-08-30 ENCOUNTER — OFFICE VISIT (OUTPATIENT)
Dept: INTERNAL MEDICINE | Facility: CLINIC | Age: 33
End: 2023-08-30
Payer: COMMERCIAL

## 2023-08-30 VITALS
WEIGHT: 193.6 LBS | DIASTOLIC BLOOD PRESSURE: 80 MMHG | HEIGHT: 64 IN | HEART RATE: 94 BPM | TEMPERATURE: 97.8 F | BODY MASS INDEX: 33.05 KG/M2 | OXYGEN SATURATION: 98 % | SYSTOLIC BLOOD PRESSURE: 122 MMHG

## 2023-08-30 DIAGNOSIS — E66.9 OBESITY (BMI 35.0-39.9 WITHOUT COMORBIDITY): ICD-10-CM

## 2023-08-30 DIAGNOSIS — E66.9 OBESITY (BMI 35.0-39.9 WITHOUT COMORBIDITY): Primary | ICD-10-CM

## 2023-08-30 DIAGNOSIS — G43.909 MIGRAINE WITHOUT STATUS MIGRAINOSUS, NOT INTRACTABLE, UNSPECIFIED MIGRAINE TYPE: ICD-10-CM

## 2023-08-30 PROCEDURE — 99214 OFFICE O/P EST MOD 30 MIN: CPT | Performed by: STUDENT IN AN ORGANIZED HEALTH CARE EDUCATION/TRAINING PROGRAM

## 2023-08-30 PROCEDURE — 1159F MED LIST DOCD IN RCRD: CPT | Performed by: STUDENT IN AN ORGANIZED HEALTH CARE EDUCATION/TRAINING PROGRAM

## 2023-08-30 PROCEDURE — 1160F RVW MEDS BY RX/DR IN RCRD: CPT | Performed by: STUDENT IN AN ORGANIZED HEALTH CARE EDUCATION/TRAINING PROGRAM

## 2023-08-30 RX ORDER — ACETAMINOPHEN AND CODEINE PHOSPHATE 120; 12 MG/5ML; MG/5ML
1 SOLUTION ORAL DAILY
COMMUNITY

## 2023-08-30 RX ORDER — BUPROPION HYDROCHLORIDE 150 MG/1
150 TABLET ORAL DAILY
Qty: 90 TABLET | Refills: 1 | Status: SHIPPED | OUTPATIENT
Start: 2023-08-30 | End: 2023-09-11

## 2023-08-30 RX ORDER — TOPIRAMATE 25 MG/1
25 TABLET ORAL DAILY
Qty: 90 TABLET | Refills: 1 | Status: SHIPPED | OUTPATIENT
Start: 2023-08-30

## 2023-08-30 NOTE — PROGRESS NOTES
Office Note     Name: Louise Colon    : 1990     MRN: [id number]     Chief Complaint  Migraine (Medication f/u )    Subjective     History of Present Illness:  Louise Colon is a 32 y.o. female who presents today for         Has hx of migraine that occurs infrequently. She normally takes ibu prn for h/a. She had a migraine on Tuesday that had vision changes and constant pain. Light and sound bothers her with migraine. She also has vomiting with migraines. Has not tried migraine meds in the past.   Few times a week HAD, one year-2 years  Migraine: 1 every 4 months.,    Obesity:  Currently on Adipex  She has been on keto diet for the last 3 years. Her highest weight was 220, she lost down to 170 but has been struggling and her weight is inc. Has not tried weight loss meds before. Was on Wellbutrin previously for mood and smoking cessation. No side effects.     Review of Systems:   Review of Systems   All other systems reviewed and are negative.    Past Medical History:   Past Medical History:   Diagnosis Date    Anxiety     Arthritis     Depression     Headache        Past Surgical History:   Past Surgical History:   Procedure Laterality Date    FEMUR FRACTURE SURGERY      KNEE SURGERY      MANDIBLE SURGERY         Family History:   Family History   Problem Relation Age of Onset    No Known Problems Mother     No Known Problems Father     No Known Problems Sister     No Known Problems Brother     Diabetes Maternal Grandmother     Hypertension Maternal Grandmother     Diabetes Maternal Grandfather     Hypertension Maternal Grandfather     Hypertension Paternal Grandmother     Hypertension Paternal Grandfather        Social History:   Social History     Socioeconomic History    Marital status: Single   Tobacco Use    Smoking status: Former     Packs/day: 0.25     Types: Cigarettes     Passive exposure: Never    Smokeless tobacco: Never    Tobacco comments:     (2021) Quit date of 2021   Vaping  Use    Vaping Use: Never used   Substance and Sexual Activity    Alcohol use: No    Drug use: Not Currently     Types: Cocaine(coke), Marijuana     Comment: hx of sunbstance abuse    Sexual activity: Yes     Partners: Male     Birth control/protection: Birth control pill, Condom       Immunizations:   Immunization History   Administered Date(s) Administered    COVID-19 (PFIZER) Purple Cap Monovalent 03/10/2021, 04/07/2021    MMR 03/14/2014    Tdap 02/06/2019        Medications:     Current Outpatient Medications:     Biotin 1 MG capsule, Take 1 mg by mouth Daily., Disp: , Rfl:     cholecalciferol (VITAMIN D3) 25 MCG (1000 UT) tablet, Take 1 tablet by mouth Daily., Disp: , Rfl:     cyclobenzaprine (FLEXERIL) 5 MG tablet, Take 1-2 tablets by mouth 2 (Two) Times a Day As Needed for Muscle Spasms., Disp: 30 tablet, Rfl: 1    ibuprofen (ADVIL,MOTRIN) 600 MG tablet, Take 1 tablet by mouth Every 8 (Eight) Hours As Needed for Mild Pain ., Disp: 12 tablet, Rfl: 0    norethindrone (MICRONOR) 0.35 MG tablet, Take 1 tablet by mouth Daily., Disp: , Rfl:     SUMAtriptan (Imitrex) 50 MG tablet, Take one tablet at onset of headache. May repeat dose one time in 2 hours if headache not relieved., Disp: 9 tablet, Rfl: 1    Zinc 50 MG capsule, Take  by mouth., Disp: , Rfl:     Blisovi FE 1/20 1-20 MG-MCG per tablet, , Disp: , Rfl:     buPROPion XL (Wellbutrin XL) 150 MG 24 hr tablet, Take 1 tablet by mouth Daily., Disp: 90 tablet, Rfl: 1    phentermine (ADIPEX-P) 37.5 MG tablet, TAKE ONE TABLET BY MOUTH EVERY MORNING BEFORE BREAKFAST, Disp: 90 tablet, Rfl: 0    Tirzepatide (Mounjaro) 2.5 MG/0.5ML solution pen-injector, Inject 0.5 mL under the skin into the appropriate area as directed 1 (One) Time Per Week., Disp: 2 mL, Rfl: 0    topiramate (Topamax) 25 MG tablet, Take 1 tablet by mouth Daily., Disp: 90 tablet, Rfl: 1    Allergies:   No Known Allergies    Objective     Vital Signs  /80   Pulse 94   Temp 97.8 °F (36.6 °C)  "(Temporal)   Ht 162.6 cm (64.02\")   Wt 87.8 kg (193 lb 9.6 oz)   SpO2 98%   BMI 33.21 kg/m²   Estimated body mass index is 33.21 kg/m² as calculated from the following:    Height as of this encounter: 162.6 cm (64.02\").    Weight as of this encounter: 87.8 kg (193 lb 9.6 oz).            Physical Exam  Constitutional:       Appearance: Normal appearance. She is obese.   Cardiovascular:      Rate and Rhythm: Normal rate and regular rhythm.      Pulses: Normal pulses.      Heart sounds: Normal heart sounds.   Pulmonary:      Effort: Pulmonary effort is normal.      Breath sounds: Normal breath sounds.   Neurological:      Mental Status: She is alert.        Result Review :                  Assessment and Plan     1. Obesity (BMI 35.0-39.9 without comorbidity)  Discussed risk associated with obesity. she was given instructions on calorie counting as well as on decreasing carbohydrate intake. she was also encouraged to start exercise regimen.  Instructed patient to download fitness dorian on her smart phone to aid in calorie counting and exercise tracking.    - buPROPion XL (Wellbutrin XL) 150 MG 24 hr tablet; Take 1 tablet by mouth Daily.  Dispense: 90 tablet; Refill: 1  - Compliance Drug Analysis, Ur - Urine, Clean Catch    2. Migraine without status migrainosus, not intractable, unspecified migraine type    - topiramate (Topamax) 25 MG tablet; Take 1 tablet by mouth Daily.  Dispense: 90 tablet; Refill: 1       Follow Up  Return in about 6 weeks (around 10/11/2023).    MD ANCA SmithE  ANANT GOLDMAN  [facility]  [address] [facility]  [address] [address] [phone]    "

## 2023-08-31 RX ORDER — PHENTERMINE HYDROCHLORIDE 37.5 MG/1
TABLET ORAL
Qty: 90 TABLET | Refills: 0 | Status: SHIPPED | OUTPATIENT
Start: 2023-08-31

## 2023-08-31 NOTE — TELEPHONE ENCOUNTER
Last Appt: 8/30/2023  Next Appt: 10/11/2023    UDS: 8/3/2023  CSA: 8/3/2023    Medication: ADIPEX   Last refill: 8/3/2023  # of refills: 30 / 0    Pharmacy:   [facility] [id number] [place] [id number] AT [facility] - [phone] [facility] [phone] FX

## 2023-09-04 LAB — DRUGS UR: NORMAL

## 2023-09-11 RX ORDER — SERTRALINE HYDROCHLORIDE 25 MG/1
25 TABLET, FILM COATED ORAL DAILY
Qty: 90 TABLET | Refills: 0 | Status: SHIPPED | OUTPATIENT
Start: 2023-09-11

## 2023-09-24 DIAGNOSIS — M54.50 LUMBAR BACK PAIN: ICD-10-CM

## 2023-09-25 NOTE — TELEPHONE ENCOUNTER
Last Appt: 8/30/2023  Next Appt: 10/11/2023    Medication: Flexeril   Last refill: 8/3/2023  # of refills:  30 / 1    Pharmacy:   [facility] [id number] [place] [id number] AT [facility] - [phone] [facility] [phone] FX

## 2023-09-27 RX ORDER — CYCLOBENZAPRINE HCL 5 MG
TABLET ORAL
Qty: 30 TABLET | Refills: 1 | Status: SHIPPED | OUTPATIENT
Start: 2023-09-27

## 2023-10-11 ENCOUNTER — OFFICE VISIT (OUTPATIENT)
Dept: INTERNAL MEDICINE | Facility: CLINIC | Age: 33
End: 2023-10-11
Payer: COMMERCIAL

## 2023-10-11 VITALS
HEIGHT: 64 IN | DIASTOLIC BLOOD PRESSURE: 72 MMHG | HEART RATE: 93 BPM | OXYGEN SATURATION: 99 % | BODY MASS INDEX: 31.82 KG/M2 | TEMPERATURE: 98.2 F | WEIGHT: 186.4 LBS | SYSTOLIC BLOOD PRESSURE: 108 MMHG

## 2023-10-11 DIAGNOSIS — E66.9 OBESITY (BMI 35.0-39.9 WITHOUT COMORBIDITY): ICD-10-CM

## 2023-10-11 DIAGNOSIS — R91.8 PULMONARY NODULES: ICD-10-CM

## 2023-10-11 DIAGNOSIS — F32.A ANXIETY AND DEPRESSION: Primary | ICD-10-CM

## 2023-10-11 DIAGNOSIS — G43.909 MIGRAINE WITHOUT STATUS MIGRAINOSUS, NOT INTRACTABLE, UNSPECIFIED MIGRAINE TYPE: ICD-10-CM

## 2023-10-11 DIAGNOSIS — F41.9 ANXIETY AND DEPRESSION: Primary | ICD-10-CM

## 2023-10-11 PROCEDURE — 99214 OFFICE O/P EST MOD 30 MIN: CPT | Performed by: STUDENT IN AN ORGANIZED HEALTH CARE EDUCATION/TRAINING PROGRAM

## 2023-10-11 PROCEDURE — 1159F MED LIST DOCD IN RCRD: CPT | Performed by: STUDENT IN AN ORGANIZED HEALTH CARE EDUCATION/TRAINING PROGRAM

## 2023-10-11 PROCEDURE — 1160F RVW MEDS BY RX/DR IN RCRD: CPT | Performed by: STUDENT IN AN ORGANIZED HEALTH CARE EDUCATION/TRAINING PROGRAM

## 2023-10-11 NOTE — PROGRESS NOTES
ct    Office Note     Name: Louise Colon    : 1990     MRN: [id number]     Chief Complaint  Anxiety and Depression    Subjective     History of Present Illness:  Louise Colon is a 32 y.o. female who presents today for     Depression/anxiety  Currently on zoloft 25mg daily, has been taking it 4 weeks,   Started noticing a different in mood in the past 2 weeks.    Tried Wellbutrin, felt like she was withdrawn or alone.      Obesity  Starting weight prior to adipex   -adipex: 195  Current weight       Has hx of migraine that occurs infrequently. She normally takes ibu prn for h/a. She had a migraine on Tuesday that had vision changes and constant pain. Light and sound bothers her with migraine. She also has vomiting with migraines. Has not tried migraine meds in the past.   Few times a week HAD, one year-2 years  Migraine: 1 every 4 months.,    Review of Systems:   Review of Systems   All other systems reviewed and are negative.      Past Medical History:   Past Medical History:   Diagnosis Date    Anxiety     Arthritis     Depression     Headache        Past Surgical History:   Past Surgical History:   Procedure Laterality Date    FEMUR FRACTURE SURGERY      KNEE SURGERY      MANDIBLE SURGERY         Family History:   Family History   Problem Relation Age of Onset    No Known Problems Mother     No Known Problems Father     No Known Problems Sister     No Known Problems Brother     Diabetes Maternal Grandmother     Hypertension Maternal Grandmother     Diabetes Maternal Grandfather     Hypertension Maternal Grandfather     Hypertension Paternal Grandmother     Hypertension Paternal Grandfather        Social History:   Social History     Socioeconomic History    Marital status: Single   Tobacco Use    Smoking status: Former     Packs/day: .25     Types: Cigarettes     Passive exposure: Never    Smokeless tobacco: Never    Tobacco comments:     (2021) Quit date of 2021   Vaping Use    Vaping Use:  "Never used   Substance and Sexual Activity    Alcohol use: No    Drug use: Not Currently     Types: Cocaine(coke), Marijuana     Comment: hx of sunbstance abuse    Sexual activity: Yes     Partners: Male     Birth control/protection: Birth control pill, Condom       Immunizations:   Immunization History   Administered Date(s) Administered    COVID-19 (PFIZER) Purple Cap Monovalent 03/10/2021, 04/07/2021    MMR 03/14/2014    Tdap 02/06/2019        Medications:     Current Outpatient Medications:     Biotin 1 MG capsule, Take 1 mg by mouth Daily., Disp: , Rfl:     cholecalciferol (VITAMIN D3) 25 MCG (1000 UT) tablet, Take 1 tablet by mouth Daily., Disp: , Rfl:     cyclobenzaprine (FLEXERIL) 5 MG tablet, TAKE ONE TO TWO TABLETS BY MOUTH TWO TIMES A DAY AS NEEDED FOR MUSCLE SPASMS, Disp: 30 tablet, Rfl: 1    ibuprofen (ADVIL,MOTRIN) 600 MG tablet, Take 1 tablet by mouth Every 8 (Eight) Hours As Needed for Mild Pain ., Disp: 12 tablet, Rfl: 0    norethindrone (MICRONOR) 0.35 MG tablet, Take 1 tablet by mouth Daily., Disp: , Rfl:     phentermine (ADIPEX-P) 37.5 MG tablet, TAKE ONE TABLET BY MOUTH EVERY MORNING BEFORE BREAKFAST, Disp: 90 tablet, Rfl: 0    sertraline (Zoloft) 25 MG tablet, Take 1 tablet by mouth Daily., Disp: 90 tablet, Rfl: 0    SUMAtriptan (Imitrex) 50 MG tablet, Take one tablet at onset of headache. May repeat dose one time in 2 hours if headache not relieved., Disp: 9 tablet, Rfl: 1    topiramate (Topamax) 25 MG tablet, Take 1 tablet by mouth Daily., Disp: 90 tablet, Rfl: 1    Zinc 50 MG capsule, Take  by mouth., Disp: , Rfl:     sertraline (Zoloft) 50 MG tablet, Take 1 tablet by mouth Daily., Disp: 30 tablet, Rfl: 0    Allergies:   No Known Allergies    Objective     Vital Signs  /72   Pulse 93   Temp 98.2 øF (36.8 øC) (Temporal)   Ht 162.6 cm (64.02\")   Wt 84.6 kg (186 lb 6.4 oz)   SpO2 99%   BMI 31.98 kg/mý   Estimated body mass index is 31.98 kg/mý as calculated from the following:    " "Height as of this encounter: 162.6 cm (64.02\").    Weight as of this encounter: 84.6 kg (186 lb 6.4 oz).            Physical Exam  Constitutional:       Appearance: Normal appearance. She is obese.   Cardiovascular:      Rate and Rhythm: Normal rate and regular rhythm.      Pulses: Normal pulses.      Heart sounds: Normal heart sounds.   Pulmonary:      Effort: Pulmonary effort is normal.      Breath sounds: Normal breath sounds.   Neurological:      Mental Status: She is alert.          Result Review :                  Assessment and Plan     1. Anxiety and depression  Controlled  Will increase dosage and trial, if patient is able to tolerate  If unable to tolerate, okay for patient to resume zoloft 25mg daily.  - sertraline (Zoloft) 50 MG tablet; Take 1 tablet by mouth Daily.  Dispense: 30 tablet; Refill: 0    2. Pulmonary nodules  Reviewed prior Ct from 2020, recommended follow up CT,   - CT Chest Low Dose Wo; Future    3. Obesity (BMI 35.0-39.9 without comorbidity)  Continue adipex  Discussed risk associated with obesity. she was given instructions on calorie counting as well as on decreasing carbohydrate intake. she was also encouraged to start exercise regimen.  Instructed patient to download fitness dorian on her smart phone to aid in calorie counting and exercise tracking.      4. Migraine without status migrainosus, not intractable, unspecified migraine type  Controlled on topamax  Continue with prophylaxis medication        Follow Up  No follow-ups on file.    MD ANCA SmithE PC [address]  [facility]  [address]  [address] [address]  [phone]    "

## 2023-11-05 DIAGNOSIS — F32.A ANXIETY AND DEPRESSION: ICD-10-CM

## 2023-11-05 DIAGNOSIS — F41.9 ANXIETY AND DEPRESSION: ICD-10-CM

## 2023-11-06 NOTE — TELEPHONE ENCOUNTER
Refill request sertraline too soon to refill   Last refill 9/11/23  Last visit 10/11/23  Next visit 12/13/23

## 2023-11-08 DIAGNOSIS — F41.9 ANXIETY AND DEPRESSION: ICD-10-CM

## 2023-11-08 DIAGNOSIS — F32.A ANXIETY AND DEPRESSION: ICD-10-CM

## 2023-11-08 NOTE — TELEPHONE ENCOUNTER
Caller: Louise Colon    Relationship: Self    Best call back number: [phone]    What is the best time to reach you: ANYTIME/ CAN LEAVE VM    Who are you requesting to speak with (clinical staff, provider,  specific staff member): CLINICAL STAFF    Do you know the name of the person who called: BELLE/ PATIENT    What was the call regarding: DENIAL OF REFILL FOR     sertraline (Zoloft) 50 MG tablet       Is it okay if the provider responds through MyChart:

## 2023-11-09 DIAGNOSIS — F32.A ANXIETY AND DEPRESSION: ICD-10-CM

## 2023-11-09 DIAGNOSIS — F41.9 ANXIETY AND DEPRESSION: ICD-10-CM

## 2023-11-22 ENCOUNTER — HOSPITAL ENCOUNTER (OUTPATIENT)
Dept: CT IMAGING | Facility: HOSPITAL | Age: 33
Discharge: HOME | End: 2023-11-22
Admitting: STUDENT IN AN ORGANIZED HEALTH CARE EDUCATION/TRAINING PROGRAM
Payer: COMMERCIAL

## 2023-11-22 DIAGNOSIS — R91.8 PULMONARY NODULES: ICD-10-CM

## 2023-11-22 PROCEDURE — 71271 CT THORAX LUNG CANCER SCR C-: CPT

## 2023-11-27 ENCOUNTER — TELEPHONE (OUTPATIENT)
Dept: INTERNAL MEDICINE | Facility: CLINIC | Age: 33
End: 2023-11-27
Payer: COMMERCIAL

## 2023-11-27 DIAGNOSIS — E66.9 OBESITY (BMI 35.0-39.9 WITHOUT COMORBIDITY): ICD-10-CM

## 2023-11-27 NOTE — TELEPHONE ENCOUNTER
Caller: Louise Colon    Relationship: Self    Best call back number: [phone]     What test was performed: CT SCAN    When was the test performed: 11/22    Where was the test performed: [facility] [address]    Additional notes: PLEASE CALL PATIENT WHEN RESULTS HAVE BEEN RECEIVED AND REVIEWED.

## 2023-11-28 DIAGNOSIS — R91.1 PULMONARY NODULE: Primary | ICD-10-CM

## 2023-12-01 DIAGNOSIS — R91.1 PULMONARY NODULE: Primary | ICD-10-CM

## 2023-12-01 DIAGNOSIS — E66.9 OBESITY (BMI 35.0-39.9 WITHOUT COMORBIDITY): ICD-10-CM

## 2023-12-01 RX ORDER — PHENTERMINE HYDROCHLORIDE 37.5 MG/1
37.5 TABLET ORAL
Qty: 30 TABLET | Refills: 0 | Status: SHIPPED | OUTPATIENT
Start: 2023-12-01

## 2023-12-01 RX ORDER — PHENTERMINE HYDROCHLORIDE 37.5 MG/1
37.5 TABLET ORAL
Qty: 90 TABLET | Refills: 0 | Status: CANCELLED | OUTPATIENT
Start: 2023-12-01

## 2023-12-01 NOTE — TELEPHONE ENCOUNTER
Please let patient know I sent MyChart message, she will need to have a repeat CT scan in about 3 months, the order is in. She also has a pulmonology appointment scheduled and they can go over her CT further.

## 2023-12-01 NOTE — TELEPHONE ENCOUNTER
Pn pt expressed understanding and stated she will need phentermine refill before next appt   Last refill 8/31/23  Last visit 10/11/23  Next visit 11/13/23

## 2023-12-05 ENCOUNTER — OFFICE VISIT (OUTPATIENT)
Dept: PULMONOLOGY | Facility: CLINIC | Age: 33
End: 2023-12-05
Payer: COMMERCIAL

## 2023-12-05 ENCOUNTER — PATIENT OUTREACH (OUTPATIENT)
Dept: ONCOLOGY | Facility: CLINIC | Age: 33
End: 2023-12-05
Payer: COMMERCIAL

## 2023-12-05 VITALS
SYSTOLIC BLOOD PRESSURE: 126 MMHG | HEART RATE: 101 BPM | DIASTOLIC BLOOD PRESSURE: 70 MMHG | TEMPERATURE: 98.6 F | WEIGHT: 107 LBS | HEIGHT: 64 IN | BODY MASS INDEX: 18.27 KG/M2 | OXYGEN SATURATION: 99 %

## 2023-12-05 DIAGNOSIS — R91.1 LUNG NODULE: Primary | ICD-10-CM

## 2023-12-06 RX ORDER — SERTRALINE HYDROCHLORIDE 25 MG/1
25 TABLET, FILM COATED ORAL DAILY
Qty: 90 TABLET | Refills: 0 | OUTPATIENT
Start: 2023-12-06

## 2023-12-07 ENCOUNTER — PATIENT ROUNDING (BHMG ONLY) (OUTPATIENT)
Dept: PULMONOLOGY | Facility: CLINIC | Age: 33
End: 2023-12-07
Payer: COMMERCIAL

## 2023-12-07 NOTE — PROGRESS NOTES
December 7, 2023    Hello, may I speak with Louise Colon?    My name is REZA      I am  with [facility]  [address] [place] [address] [phone].    Before we get started may I verify your date of birth? 1990    I am calling to officially welcome you to our practice and ask about your recent visit. Is this a good time to talk? yes    Tell me about your visit with us. What things went well?  YES I HAVE NOTHING BAD TO SAY SURPRISINGLY EVERYONE WAS SUPER NICE        We're always looking for ways to make our patients' experiences even better. Do you have recommendations on ways we may improve?  no    Overall were you satisfied with your first visit to our practice? yes       I appreciate you taking the time to speak with me today. Is there anything else I can do for you? no      Thank you, and have a great day.

## 2023-12-12 DIAGNOSIS — M54.50 LUMBAR BACK PAIN: ICD-10-CM

## 2023-12-13 ENCOUNTER — OFFICE VISIT (OUTPATIENT)
Dept: INTERNAL MEDICINE | Facility: CLINIC | Age: 33
End: 2023-12-13
Payer: COMMERCIAL

## 2023-12-13 VITALS
OXYGEN SATURATION: 99 % | BODY MASS INDEX: 31.11 KG/M2 | WEIGHT: 182.22 LBS | DIASTOLIC BLOOD PRESSURE: 62 MMHG | TEMPERATURE: 97.8 F | HEART RATE: 109 BPM | HEIGHT: 64 IN | SYSTOLIC BLOOD PRESSURE: 110 MMHG

## 2023-12-13 DIAGNOSIS — G43.909 MIGRAINE WITHOUT STATUS MIGRAINOSUS, NOT INTRACTABLE, UNSPECIFIED MIGRAINE TYPE: ICD-10-CM

## 2023-12-13 DIAGNOSIS — F41.9 ANXIETY AND DEPRESSION: ICD-10-CM

## 2023-12-13 DIAGNOSIS — F32.A ANXIETY AND DEPRESSION: ICD-10-CM

## 2023-12-13 DIAGNOSIS — E66.01 MORBID (SEVERE) OBESITY DUE TO EXCESS CALORIES: Primary | ICD-10-CM

## 2023-12-13 RX ORDER — CYCLOBENZAPRINE HCL 5 MG
TABLET ORAL
Qty: 30 TABLET | Refills: 1 | OUTPATIENT
Start: 2023-12-13

## 2023-12-13 RX ORDER — CYCLOBENZAPRINE HCL 5 MG
5 TABLET ORAL NIGHTLY PRN
Qty: 30 TABLET | Refills: 0 | Status: SHIPPED | OUTPATIENT
Start: 2023-12-13

## 2023-12-13 NOTE — PROGRESS NOTES
Office Note     Name: Louise Colon    : 1990     MRN: [id number]     Chief Complaint  Weight Check    Subjective     History of Present Illness:  Louise Colon is a 32 y.o. female who presents today for     Obesity:  Currently on Adipex  She has been on keto diet for the last 3 years. Her highest weight was 220, she lost down to 170 but has been struggling and her weight is inc. Has not tried weight loss meds before. Was on Wellbutrin previously for mood and smoking cessation. No side effects    Depression/anxiety  Currently on zoloft 50 daily,  Mood stable, no worsenign aniety or depression  No suicidal thoughts, no psychotic symptoms or manic symptoms.      Has hx of migraine that occurs infrequently. She normally takes ibu prn for h/a. She had a migraine on Tuesday that had vision changes and constant pain. Light and sound bothers her with migraine. She also has vomiting with migraines. Has not tried migraine meds in the past.   Few times a week HAD, one year-2 years  Migraine: 1 every 4 months.,    Review of Systems:   Review of Systems   All other systems reviewed and are negative.      Past Medical History:   Past Medical History:   Diagnosis Date    Anxiety     Arthritis     Depression     Headache        Past Surgical History:   Past Surgical History:   Procedure Laterality Date    FEMUR FRACTURE SURGERY      KNEE SURGERY      MANDIBLE SURGERY         Family History:   Family History   Problem Relation Age of Onset    No Known Problems Mother     No Known Problems Father     No Known Problems Sister     No Known Problems Brother     Diabetes Maternal Grandmother     Hypertension Maternal Grandmother     Diabetes Maternal Grandfather     Hypertension Maternal Grandfather     Hypertension Paternal Grandmother     Hypertension Paternal Grandfather        Social History:   Social History     Socioeconomic History    Marital status: Single   Tobacco Use    Smoking status: Former     Packs/day: .25  "    Types: Cigarettes     Passive exposure: Never    Smokeless tobacco: Never    Tobacco comments:     (12/29/2021) Quit date of 4/27/2021   Vaping Use    Vaping Use: Never used   Substance and Sexual Activity    Alcohol use: No    Drug use: Not Currently     Types: Cocaine(coke), Marijuana     Comment: hx of sunbstance abuse    Sexual activity: Yes     Partners: Male     Birth control/protection: Birth control pill, Condom       Immunizations:   Immunization History   Administered Date(s) Administered    COVID-19 (PFIZER) Purple Cap Monovalent 03/10/2021, 04/07/2021    MMR 03/14/2014    Tdap 02/06/2019        Medications:     Current Outpatient Medications:     Biotin 1 MG capsule, Take 1 mg by mouth Daily., Disp: , Rfl:     cholecalciferol (VITAMIN D3) 25 MCG (1000 UT) tablet, Take 1 tablet by mouth Daily., Disp: , Rfl:     cyclobenzaprine (FLEXERIL) 5 MG tablet, TAKE ONE TO TWO TABLETS BY MOUTH TWO TIMES A DAY AS NEEDED FOR MUSCLE SPASMS, Disp: 30 tablet, Rfl: 1    ibuprofen (ADVIL,MOTRIN) 600 MG tablet, Take 1 tablet by mouth Every 8 (Eight) Hours As Needed for Mild Pain ., Disp: 12 tablet, Rfl: 0    norethindrone (MICRONOR) 0.35 MG tablet, Take 1 tablet by mouth Daily., Disp: , Rfl:     phentermine (ADIPEX-P) 37.5 MG tablet, Take 1 tablet by mouth Every Morning Before Breakfast., Disp: 30 tablet, Rfl: 0    sertraline (Zoloft) 50 MG tablet, Take 1 tablet by mouth Daily., Disp: 90 tablet, Rfl: 2    SUMAtriptan (Imitrex) 50 MG tablet, Take one tablet at onset of headache. May repeat dose one time in 2 hours if headache not relieved., Disp: 9 tablet, Rfl: 1    topiramate (Topamax) 25 MG tablet, Take 1 tablet by mouth Daily., Disp: 90 tablet, Rfl: 1    Zinc 50 MG capsule, Take  by mouth., Disp: , Rfl:     Allergies:   No Known Allergies    Objective     Vital Signs  /62   Pulse 109   Temp 97.8 °F (36.6 °C) (Temporal)   Ht 162.6 cm (64.02\")   Wt 82.7 kg (182 lb 3.5 oz)   SpO2 99%   BMI 31.26 kg/m² " "  Estimated body mass index is 31.26 kg/m² as calculated from the following:    Height as of this encounter: 162.6 cm (64.02\").    Weight as of this encounter: 82.7 kg (182 lb 3.5 oz).            Physical Exam  Constitutional:       Appearance: Normal appearance. She is obese.   Cardiovascular:      Rate and Rhythm: Normal rate and regular rhythm.      Pulses: Normal pulses.      Heart sounds: Normal heart sounds.   Pulmonary:      Effort: Pulmonary effort is normal.      Breath sounds: Normal breath sounds.   Neurological:      Mental Status: She is alert.          Result Review :                  Assessment and Plan     1. Morbid (severe) obesity due to excess calories  Discussed risk associated with obesity. she was given instructions on calorie counting as well as on decreasing carbohydrate intake. she was also encouraged to start exercise regimen.  Instructed patient to download fitness dorian on her smart phone to aid in calorie counting and exercise tracking.      2. Anxiety and depression  Controlled  Continue with zoloft 50mg daily.     3. Migraine without status migrainosus, not intractable, unspecified migraine type  Controlled on topamax  Continue with prophylaxis medication            Follow Up  No follow-ups on file.    MD DARIUS Smith PC [address]  [facility]  [address]  [address] [id number] [phone]    "

## 2024-01-23 RX ORDER — CYCLOBENZAPRINE HCL 5 MG
TABLET ORAL
Qty: 30 TABLET | Refills: 0 | Status: SHIPPED | OUTPATIENT
Start: 2024-01-23

## 2024-01-30 DIAGNOSIS — E66.9 OBESITY (BMI 35.0-39.9 WITHOUT COMORBIDITY): Primary | ICD-10-CM

## 2024-01-30 RX ORDER — PHENTERMINE HYDROCHLORIDE 37.5 MG/1
37.5 TABLET ORAL
Qty: 90 TABLET | Refills: 0 | Status: SHIPPED | OUTPATIENT
Start: 2024-01-30

## 2024-02-14 DIAGNOSIS — G43.909 MIGRAINE WITHOUT STATUS MIGRAINOSUS, NOT INTRACTABLE, UNSPECIFIED MIGRAINE TYPE: ICD-10-CM

## 2024-02-15 RX ORDER — TOPIRAMATE 25 MG/1
25 TABLET ORAL DAILY
Qty: 90 TABLET | Refills: 3 | Status: SHIPPED | OUTPATIENT
Start: 2024-02-15

## 2024-02-26 ENCOUNTER — HOSPITAL ENCOUNTER (OUTPATIENT)
Dept: CT IMAGING | Facility: HOSPITAL | Age: 34
Discharge: HOME | End: 2024-02-26
Admitting: INTERNAL MEDICINE
Payer: COMMERCIAL

## 2024-02-26 DIAGNOSIS — R91.1 LUNG NODULE: ICD-10-CM

## 2024-02-26 PROCEDURE — 71250 CT THORAX DX C-: CPT

## 2024-03-01 ENCOUNTER — OFFICE VISIT (OUTPATIENT)
Dept: PULMONOLOGY | Facility: CLINIC | Age: 34
End: 2024-03-01
Payer: COMMERCIAL

## 2024-03-01 VITALS
SYSTOLIC BLOOD PRESSURE: 104 MMHG | HEIGHT: 64 IN | TEMPERATURE: 99.3 F | OXYGEN SATURATION: 100 % | BODY MASS INDEX: 30.56 KG/M2 | DIASTOLIC BLOOD PRESSURE: 78 MMHG | HEART RATE: 105 BPM | WEIGHT: 179 LBS

## 2024-03-01 DIAGNOSIS — R91.1 LUNG NODULE: Primary | ICD-10-CM

## 2024-03-01 NOTE — PROGRESS NOTES
"Follow Up Office Note       Patient Name: Louise Colon    Referring Physician: No ref. provider found    Chief Complaint:    Chief Complaint   Patient presents with    Lung Nodule     Follow up       History of Present Illness: Louise Colon is a 33 y.o. female who is here today to follow-up care with Pulmonary.  Patient with a past medical history significant for migraines. Was referred to pulmonary for evaluation of pulmonary nodule.  Patient currently doing well denies any pulmonary complaints.    Review of Systems:   Review of Systems   Constitutional:  Negative for chills, fatigue and fever.   HENT:  Negative for congestion and voice change.    Eyes:  Negative for blurred vision.   Respiratory:  Negative for cough, shortness of breath and wheezing.    Cardiovascular:  Negative for chest pain.   Skin:  Negative for dry skin.   Hematological:  Negative for adenopathy.   Psychiatric/Behavioral:  Negative for agitation and depressed mood.        The following portions of the patient's history were reviewed and updated as appropriate: allergies, current medications, past family history, past medical history, past social history, past surgical history and problem list.    Physical Exam:  Vital Signs:   Vitals:    03/01/24 0815   BP: 104/78   BP Location: Left arm   Patient Position: Sitting   Cuff Size: Adult   Pulse: 105   Temp: 99.3 °F (37.4 °C)   SpO2: 100%  Comment: Room air at rest   Weight: 81.2 kg (179 lb)   Height: 162.6 cm (64.02\")       Physical Exam  Vitals and nursing note reviewed.   Constitutional:       General: She is not in acute distress.     Appearance: She is well-developed and normal weight. She is not ill-appearing or toxic-appearing.   HENT:      Head: Normocephalic and atraumatic.   Cardiovascular:      Rate and Rhythm: Normal rate and regular rhythm.      Pulses: Normal pulses.      Heart sounds: Normal heart sounds. No murmur heard.     No friction rub. No gallop.   Pulmonary:      Effort: " Pulmonary effort is normal. No respiratory distress.      Breath sounds: Normal breath sounds. No wheezing, rhonchi or rales.   Musculoskeletal:      Right lower leg: No edema.      Left lower leg: No edema.   Skin:     General: Skin is warm and dry.   Neurological:      Mental Status: She is alert and oriented to person, place, and time.         Immunization History   Administered Date(s) Administered    COVID-19 (PFIZER) Purple Cap Monovalent 03/10/2021, 04/07/2021    MMR 03/14/2014    Tdap 02/06/2019       Results Review:   - I personally reviewed the pts imaging from CT scan of the chest from 2/22/2024 showed mild enlargement and a still 9 mm lingula nodule, multiple other small scattered sub-5 mm nodules also seen.  - CT scan of the chest from November 2023 showed a 9 mm left upper lobe lingula nodule, there is also a small 3 mm right lower lobe nodule.  The 9 mm nodule is new, the 3 mm right lower lobe nodule was previously present in 2020.  - PFT from 12/5/2023 shows no obstruction or restriction with a normal DLCO.  - Echo report from January 2020 showed an EF of 61%, with normal left ventricular systolic function.    Assessment / Plan:   Diagnoses and all orders for this visit:    1. 9 mm lingula nodule (November 2023) (Primary)  -I reviewed the patient's CT scan with her, to my evaluation I do not see much change but radiology is saying that there is a enlargement of at least 2 mm of the nodule.  We discussed on today's visit the possibility of undergoing a navigational bronchoscopy, PET scan or close follow-up.  She still would like to keep close follow-up I explained to her that if it continues to grow then we will eventually have to biopsy the nodule.  I explained the procedure to her in detail today we discussed risk and benefits she verbalized understanding of all of the thing.  If she changes mind and wants a biopsy prior to the neck CT scan I told her to call the office and we will set her up with a  navigational bronchoscopy.  Otherwise I have ordered a CT scan for 3 months.      Follow Up:   Return in about 3 months (around 6/1/2024) for CT Chest with next visit.       BELEM [place],   Pulmonary and Critical Care Medicine  Note Electronically Signed    Part of this note may be an electronic transcription/translation of spoken language to printed text using the Dragon Dictation System.

## 2024-03-19 DIAGNOSIS — M54.50 LUMBAR BACK PAIN: ICD-10-CM

## 2024-03-19 DIAGNOSIS — G43.909 MIGRAINE WITHOUT STATUS MIGRAINOSUS, NOT INTRACTABLE, UNSPECIFIED MIGRAINE TYPE: ICD-10-CM

## 2024-03-19 RX ORDER — SUMATRIPTAN 50 MG/1
TABLET, FILM COATED ORAL
Qty: 9 TABLET | Refills: 1 | Status: SHIPPED | OUTPATIENT
Start: 2024-03-19

## 2024-03-21 RX ORDER — CYCLOBENZAPRINE HCL 5 MG
5 TABLET ORAL NIGHTLY PRN
Qty: 30 TABLET | Refills: 1 | Status: SHIPPED | OUTPATIENT
Start: 2024-03-21

## 2024-03-27 ENCOUNTER — OFFICE VISIT (OUTPATIENT)
Dept: INTERNAL MEDICINE | Facility: CLINIC | Age: 34
End: 2024-03-27
Payer: COMMERCIAL

## 2024-03-27 VITALS
DIASTOLIC BLOOD PRESSURE: 68 MMHG | OXYGEN SATURATION: 100 % | HEIGHT: 64 IN | BODY MASS INDEX: 30.73 KG/M2 | WEIGHT: 180 LBS | HEART RATE: 91 BPM | TEMPERATURE: 97.3 F | SYSTOLIC BLOOD PRESSURE: 100 MMHG

## 2024-03-27 DIAGNOSIS — F41.9 ANXIETY AND DEPRESSION: ICD-10-CM

## 2024-03-27 DIAGNOSIS — G43.909 MIGRAINE WITHOUT STATUS MIGRAINOSUS, NOT INTRACTABLE, UNSPECIFIED MIGRAINE TYPE: Primary | ICD-10-CM

## 2024-03-27 DIAGNOSIS — R91.1 PULMONARY NODULE: ICD-10-CM

## 2024-03-27 DIAGNOSIS — E66.01 MORBID (SEVERE) OBESITY DUE TO EXCESS CALORIES: ICD-10-CM

## 2024-03-27 DIAGNOSIS — E66.9 OBESITY (BMI 35.0-39.9 WITHOUT COMORBIDITY): ICD-10-CM

## 2024-03-27 DIAGNOSIS — F32.A ANXIETY AND DEPRESSION: ICD-10-CM

## 2024-03-27 NOTE — PROGRESS NOTES
Office Note     Name: Louise Colon    : 1990     MRN: [id number]     Chief Complaint  Obesity (F/u)    Subjective     History of Present Illness:  Louise Colon is a 33 y.o. female who presents today for     Weight management  Current weight 180  Breakfast: Plant-based cereal( was doing protein shakes, yogurt)   Lunch: Tuna sandwich, quest   Dinner: protein(chicken)  Was doing a keto diet,       Migraine  Topamax,   Imitrex as needed    Constipation:  Taking miralax,( would not be able to use the bathroom for 1 week  Chronic issue(a few years per patient), hard pebble  Has been using stool softer/miralax.       Depression/anxiety  Currently on zoloft 50 mg daily, has been taking it 4 weeks,   Started noticing a different in mood in the past 2 weeks.     Tried Wellbutrin, felt like she was withdrawn or alone.      Review of Systems:   Review of Systems   All other systems reviewed and are negative.      Past Medical History:   Past Medical History:   Diagnosis Date    Anxiety     Arthritis     Depression     Headache        Past Surgical History:   Past Surgical History:   Procedure Laterality Date    FEMUR FRACTURE SURGERY      KNEE SURGERY      MANDIBLE SURGERY         Family History:   Family History   Problem Relation Age of Onset    No Known Problems Mother     No Known Problems Father     No Known Problems Sister     No Known Problems Brother     Diabetes Maternal Grandmother     Hypertension Maternal Grandmother     Diabetes Maternal Grandfather     Hypertension Maternal Grandfather     Hypertension Paternal Grandmother     Hypertension Paternal Grandfather        Social History:   Social History     Socioeconomic History    Marital status: Single   Tobacco Use    Smoking status: Former     Current packs/day: 0.00     Types: Cigarettes     Quit date:      Years since quitting: 3.2     Passive exposure: Never    Smokeless tobacco: Never    Tobacco comments:     (2021) Quit date of  "4/27/2021   Vaping Use    Vaping status: Never Used   Substance and Sexual Activity    Alcohol use: No    Drug use: Not Currently     Types: Cocaine(coke), Marijuana     Comment: hx of sunbstance abuse    Sexual activity: Yes     Partners: Male     Birth control/protection: Birth control pill, Condom       Immunizations:   Immunization History   Administered Date(s) Administered    COVID-19 (PFIZER) Purple Cap Monovalent 03/10/2021, 04/07/2021    MMR 03/14/2014    Tdap 02/06/2019        Medications:     Current Outpatient Medications:     Biotin 1 MG capsule, Take 1 mg by mouth Daily., Disp: , Rfl:     cholecalciferol (VITAMIN D3) 25 MCG (1000 UT) tablet, Take 1 tablet by mouth Daily., Disp: , Rfl:     cyclobenzaprine (FLEXERIL) 5 MG tablet, TAKE ONE TABLET BY MOUTH ONCE NIGHTLY AS NEEDED FOR MUSCLE SPASMS, Disp: 30 tablet, Rfl: 1    ibuprofen (ADVIL,MOTRIN) 600 MG tablet, Take 1 tablet by mouth Every 8 (Eight) Hours As Needed for Mild Pain ., Disp: 12 tablet, Rfl: 0    norethindrone (MICRONOR) 0.35 MG tablet, Take 1 tablet by mouth Daily., Disp: , Rfl:     phentermine (Adipex-P) 37.5 MG tablet, Take 1 tablet by mouth Every Morning Before Breakfast., Disp: 90 tablet, Rfl: 0    sertraline (Zoloft) 50 MG tablet, Take 1 tablet by mouth Daily., Disp: 90 tablet, Rfl: 2    SUMAtriptan (IMITREX) 50 MG tablet, TAKE 1 TABLET BY MOUTH AT ONSET OF HEADACHE; MAY REPEAT 1 TABLET IN 2 HOURS IF NEEDED., Disp: 9 tablet, Rfl: 1    topiramate (TOPAMAX) 25 MG tablet, TAKE 1 TABLET BY MOUTH DAILY, Disp: 90 tablet, Rfl: 3    Zinc 50 MG capsule, Take  by mouth., Disp: , Rfl:     Allergies:   No Known Allergies    Objective     Vital Signs  /68   Pulse 91   Temp 97.3 °F (36.3 °C) (Temporal)   Ht 162.6 cm (64.02\")   Wt 81.6 kg (180 lb)   SpO2 100%   BMI 30.88 kg/m²   Estimated body mass index is 30.88 kg/m² as calculated from the following:    Height as of this encounter: 162.6 cm (64.02\").    Weight as of this encounter: 81.6 " kg (180 lb).            Physical Exam  Constitutional:       Appearance: Normal appearance. She is obese.   Cardiovascular:      Rate and Rhythm: Normal rate and regular rhythm.      Pulses: Normal pulses.      Heart sounds: Normal heart sounds.   Pulmonary:      Effort: Pulmonary effort is normal.      Breath sounds: Normal breath sounds.   Neurological:      Mental Status: She is alert.          Result Review :       Data reviewed : Consultant notes Reviewed Pulmonology notes(3/01/2024), 3/19/2024, 3/19/2024,            Assessment and Plan     1. Migraine without status migrainosus, not intractable, unspecified migraine type  Stable   Continue with topamax for prophlyaltic,   Continue with imitrex PRN for abortive therapy    2. Obesity (BMI 35.0-39.9 without comorbidity)  3 month trial of adipex  Discussed risk associated with obesity. she was given instructions on calorie counting as well as on decreasing carbohydrate intake. she was also encouraged to start exercise regimen.  Instructed patient to download fitness dorian on her smart phone to aid in calorie counting and exercise tracking.      3. Morbid (severe) obesity due to excess calories      4. Anxiety and depression  Continue with zoloft mg daily    5. Pulmonary nodule  Follow up imaging in 3 months.        Follow Up  Return in about 3 months (around 6/27/2024) for Annual.    MD DARIUS Smith PC [address]  [facility]  [address]  [address] [address] [phone]

## 2024-04-03 ENCOUNTER — OFFICE VISIT (OUTPATIENT)
Dept: INTERNAL MEDICINE | Facility: CLINIC | Age: 34
End: 2024-04-03
Payer: COMMERCIAL

## 2024-04-03 VITALS
TEMPERATURE: 97.1 F | SYSTOLIC BLOOD PRESSURE: 118 MMHG | DIASTOLIC BLOOD PRESSURE: 82 MMHG | WEIGHT: 180.2 LBS | HEIGHT: 64 IN | HEART RATE: 96 BPM | BODY MASS INDEX: 30.77 KG/M2 | OXYGEN SATURATION: 98 %

## 2024-04-03 DIAGNOSIS — F41.9 ANXIETY AND DEPRESSION: ICD-10-CM

## 2024-04-03 DIAGNOSIS — F32.A ANXIETY AND DEPRESSION: ICD-10-CM

## 2024-04-03 DIAGNOSIS — B35.1 ONYCHOMYCOSIS: Primary | ICD-10-CM

## 2024-04-03 DIAGNOSIS — T78.40XA ALLERGIC DISORDER, INITIAL ENCOUNTER: ICD-10-CM

## 2024-04-03 DIAGNOSIS — E66.01 MORBID (SEVERE) OBESITY DUE TO EXCESS CALORIES: ICD-10-CM

## 2024-04-03 RX ORDER — CETIRIZINE HYDROCHLORIDE 10 MG/1
10 TABLET ORAL DAILY
COMMUNITY

## 2024-04-03 RX ORDER — TERBINAFINE HYDROCHLORIDE 250 MG/1
250 TABLET ORAL DAILY
Qty: 90 TABLET | Refills: 1 | Status: SHIPPED | OUTPATIENT
Start: 2024-04-03

## 2024-04-03 RX ORDER — OLOPATADINE HYDROCHLORIDE 1 MG/ML
1 SOLUTION/ DROPS OPHTHALMIC 2 TIMES DAILY
Qty: 5 ML | Refills: 12 | Status: SHIPPED | OUTPATIENT
Start: 2024-04-03

## 2024-04-03 RX ORDER — PRENATAL VIT 91/IRON/FOLIC/DHA 28-975-200
1 COMBINATION PACKAGE (EA) ORAL 2 TIMES DAILY
Qty: 42 G | Refills: 0 | Status: SHIPPED | OUTPATIENT
Start: 2024-04-03

## 2024-04-03 NOTE — PROGRESS NOTES
Office Note     Name: Louise Colon    : 1990     MRN: [id number]     Chief Complaint  Nail Problem (3 toes Thick nail on rt foot)    Subjective     History of Present Illness:  Louise Colon is a 33 y.o. female who presents today for     Concerns about toe thickening and nail fungus on her right foot/toe nails    Seasonal allergies have started acting up. Complains of itchy/dry eyes.    Weight management  Current weight 180  Breakfast: Plant-based cereal( was doing protein shakes, yogurt)   Lunch: Tuna sandwich, quest   Dinner: protein(chicken)  Was doing a keto diet,        Depression/anxiety  Currently on zoloft 50 mg daily, has been taking it 4 weeks,   Started noticing a different in mood in the past 2 weeks.    Review of Systems:   Review of Systems   All other systems reviewed and are negative.      Past Medical History:   Past Medical History:   Diagnosis Date    Anxiety     Arthritis     Depression     Headache        Past Surgical History:   Past Surgical History:   Procedure Laterality Date    FEMUR FRACTURE SURGERY      KNEE SURGERY      MANDIBLE SURGERY         Family History:   Family History   Problem Relation Age of Onset    No Known Problems Mother     No Known Problems Father     No Known Problems Sister     No Known Problems Brother     Diabetes Maternal Grandmother     Hypertension Maternal Grandmother     Diabetes Maternal Grandfather     Hypertension Maternal Grandfather     Hypertension Paternal Grandmother     Hypertension Paternal Grandfather        Social History:   Social History     Socioeconomic History    Marital status: Single   Tobacco Use    Smoking status: Former     Current packs/day: 0.00     Types: Cigarettes     Quit date:      Years since quitting: 3.2     Passive exposure: Never    Smokeless tobacco: Never    Tobacco comments:     (2021) Quit date of 2021   Vaping Use    Vaping status: Never Used   Substance and Sexual Activity    Alcohol use: No     "Drug use: Not Currently     Types: Cocaine(coke), Marijuana     Comment: hx of sunbstance abuse    Sexual activity: Yes     Partners: Male     Birth control/protection: Birth control pill, Condom       Immunizations:   Immunization History   Administered Date(s) Administered    COVID-19 (PFIZER) Purple Cap Monovalent 03/10/2021, 04/07/2021    MMR 03/14/2014    Tdap 02/06/2019        Medications:     Current Outpatient Medications:     Biotin 1 MG capsule, Take 1 mg by mouth Daily., Disp: , Rfl:     cetirizine (zyrTEC) 10 MG tablet, Take 1 tablet by mouth Daily., Disp: , Rfl:     cholecalciferol (VITAMIN D3) 25 MCG (1000 UT) tablet, Take 1 tablet by mouth Daily., Disp: , Rfl:     cyclobenzaprine (FLEXERIL) 5 MG tablet, TAKE ONE TABLET BY MOUTH ONCE NIGHTLY AS NEEDED FOR MUSCLE SPASMS, Disp: 30 tablet, Rfl: 1    ibuprofen (ADVIL,MOTRIN) 600 MG tablet, Take 1 tablet by mouth Every 8 (Eight) Hours As Needed for Mild Pain ., Disp: 12 tablet, Rfl: 0    norethindrone (MICRONOR) 0.35 MG tablet, Take 1 tablet by mouth Daily., Disp: , Rfl:     phentermine (Adipex-P) 37.5 MG tablet, Take 1 tablet by mouth Every Morning Before Breakfast., Disp: 90 tablet, Rfl: 0    sertraline (Zoloft) 50 MG tablet, Take 1 tablet by mouth Daily., Disp: 90 tablet, Rfl: 2    SUMAtriptan (IMITREX) 50 MG tablet, TAKE 1 TABLET BY MOUTH AT ONSET OF HEADACHE; MAY REPEAT 1 TABLET IN 2 HOURS IF NEEDED., Disp: 9 tablet, Rfl: 1    topiramate (TOPAMAX) 25 MG tablet, TAKE 1 TABLET BY MOUTH DAILY, Disp: 90 tablet, Rfl: 3    Zinc 50 MG capsule, Take  by mouth., Disp: , Rfl:     Allergies:   No Known Allergies    Objective     Vital Signs  /82   Pulse 96   Temp 97.1 °F (36.2 °C) (Temporal)   Ht 162.6 cm (64.02\")   Wt 81.7 kg (180 lb 3.2 oz)   SpO2 98%   BMI 30.92 kg/m²   Estimated body mass index is 30.92 kg/m² as calculated from the following:    Height as of this encounter: 162.6 cm (64.02\").    Weight as of this encounter: 81.7 kg (180 lb 3.2 " oz).            Physical Exam  Constitutional:       Appearance: Normal appearance. She is obese.   Cardiovascular:      Rate and Rhythm: Normal rate and regular rhythm.      Pulses: Normal pulses.      Heart sounds: Normal heart sounds.   Pulmonary:      Effort: Pulmonary effort is normal.      Breath sounds: Normal breath sounds.   Feet:      Right foot:      Toenail Condition: Right toenails are abnormally thick. Fungal disease present.     Left foot:      Toenail Condition: Left toenails are abnormally thick. Fungal disease present.  Neurological:      Mental Status: She is alert.          Result Review :                  Assessment and Plan     There are no diagnoses linked to this encounter.     Follow Up  No follow-ups on file.    MD DARIUS [facility] [address]  [facility]  [address]  [address] [address] [phone]

## 2024-04-09 ENCOUNTER — TELEPHONE (OUTPATIENT)
Dept: INTERNAL MEDICINE | Facility: CLINIC | Age: 34
End: 2024-04-09
Payer: COMMERCIAL

## 2024-04-09 NOTE — TELEPHONE ENCOUNTER
Caller: Louise Colon    Relationship: Self    Best call back number: [phone]    What is the best time to reach you: ANYTIME     Who are you requesting to speak with (clinical staff, provider,  specific staff member): DOCTOR OR NURSE     What was the call regarding: THE PATIENT STATES THAT SHE HAS BEEN TAKING THE ALLERGY EYE DROPS THAT SHE WAS PRESCRIBED BUT IT IS NOT HELPING HER UNDER EYES SHE STATES THAT THEY ARE STILL SWOLLEN AND RED SHE WOULD LIKE TO KNOW WHAT TO DO

## 2024-04-10 RX ORDER — CROMOLYN SODIUM 40 MG/ML
1 SOLUTION/ DROPS OPHTHALMIC 4 TIMES DAILY
Qty: 10 ML | Refills: 12 | Status: SHIPPED | OUTPATIENT
Start: 2024-04-10

## 2024-04-10 NOTE — TELEPHONE ENCOUNTER
PN, PT EXPRESSED UNDERSTANDING     Please let patient know I sent an alternative eye drop called Cromlyn eye drops

## 2024-05-13 DIAGNOSIS — E66.9 OBESITY (BMI 35.0-39.9 WITHOUT COMORBIDITY): ICD-10-CM

## 2024-05-14 RX ORDER — PHENTERMINE HYDROCHLORIDE 37.5 MG/1
37.5 TABLET ORAL
Qty: 90 TABLET | Refills: 0 | Status: SHIPPED | OUTPATIENT
Start: 2024-05-14

## 2024-05-15 ENCOUNTER — PRIOR AUTHORIZATION (OUTPATIENT)
Dept: INTERNAL MEDICINE | Facility: CLINIC | Age: 34
End: 2024-05-15
Payer: COMMERCIAL

## 2024-05-15 NOTE — TELEPHONE ENCOUNTER
PA submitted on Phentermine 37.5 mg.  KEY   [id number]  Medication is denied it is a plan exclusion.  Patient has been notified and she pays out of pocket for the medication.

## 2024-06-19 ENCOUNTER — HOSPITAL ENCOUNTER (OUTPATIENT)
Dept: CT IMAGING | Facility: HOSPITAL | Age: 34
Discharge: HOME | End: 2024-06-19
Admitting: INTERNAL MEDICINE
Payer: COMMERCIAL

## 2024-06-19 DIAGNOSIS — R91.1 LUNG NODULE: ICD-10-CM

## 2024-06-19 PROCEDURE — 71250 CT THORAX DX C-: CPT

## 2024-06-21 ENCOUNTER — TELEPHONE (OUTPATIENT)
Dept: PULMONOLOGY | Facility: CLINIC | Age: 34
End: 2024-06-21
Payer: COMMERCIAL

## 2024-06-21 NOTE — TELEPHONE ENCOUNTER
Pt called today requesting for her most recent Chest CT Scan results that was performed on 6/19/24. Pt notified that nodule is stable, no other suspicious nodules, or no other findings. Pt verbalized understanding.

## 2024-06-24 DIAGNOSIS — R91.1 LUNG NODULE: Primary | ICD-10-CM

## 2024-07-03 ENCOUNTER — OFFICE VISIT (OUTPATIENT)
Dept: INTERNAL MEDICINE | Facility: CLINIC | Age: 34
End: 2024-07-03
Payer: COMMERCIAL

## 2024-07-03 VITALS
SYSTOLIC BLOOD PRESSURE: 100 MMHG | HEIGHT: 64 IN | OXYGEN SATURATION: 99 % | DIASTOLIC BLOOD PRESSURE: 72 MMHG | HEART RATE: 94 BPM | WEIGHT: 184.6 LBS | BODY MASS INDEX: 31.51 KG/M2 | TEMPERATURE: 97.3 F

## 2024-07-03 DIAGNOSIS — F32.A ANXIETY AND DEPRESSION: Primary | ICD-10-CM

## 2024-07-03 DIAGNOSIS — E66.9 OBESITY (BMI 35.0-39.9 WITHOUT COMORBIDITY): ICD-10-CM

## 2024-07-03 DIAGNOSIS — F41.9 ANXIETY AND DEPRESSION: Primary | ICD-10-CM

## 2024-07-03 PROCEDURE — 99214 OFFICE O/P EST MOD 30 MIN: CPT | Performed by: STUDENT IN AN ORGANIZED HEALTH CARE EDUCATION/TRAINING PROGRAM

## 2024-07-03 PROCEDURE — 1160F RVW MEDS BY RX/DR IN RCRD: CPT | Performed by: STUDENT IN AN ORGANIZED HEALTH CARE EDUCATION/TRAINING PROGRAM

## 2024-07-03 PROCEDURE — 1159F MED LIST DOCD IN RCRD: CPT | Performed by: STUDENT IN AN ORGANIZED HEALTH CARE EDUCATION/TRAINING PROGRAM

## 2024-07-03 PROCEDURE — 1125F AMNT PAIN NOTED PAIN PRSNT: CPT | Performed by: STUDENT IN AN ORGANIZED HEALTH CARE EDUCATION/TRAINING PROGRAM

## 2024-07-03 NOTE — PROGRESS NOTES
Office Note     Name: Louise Colon    : 1990     MRN: [id number]     Chief Complaint  Anxiety (F/u/) and Depression    Subjective     History of Present Illness:  Louise Colon is a 33 y.o. female who presents today for       Depression/anxiety  Currently on zoloft 50 mg daily, has been taking it 4 weeks,   Started noticing a different in mood in the past 2 weeks.       Weight management  Current weight 180  Breakfast: Plant-based cereal( was doing protein shakes, yogurt)   Lunch: Tuna sandwich, quest   Dinner: protein(chicken)  Was doing a keto diet,        Had follow up CT pulmonary nodule  completed on 2024, per results it was stable.     Review of Systems:   Review of Systems   All other systems reviewed and are negative.      Past Medical History:   Past Medical History:   Diagnosis Date    Anxiety     Arthritis     Depression     Headache        Past Surgical History:   Past Surgical History:   Procedure Laterality Date    FEMUR FRACTURE SURGERY      KNEE SURGERY      MANDIBLE SURGERY         Family History:   Family History   Problem Relation Age of Onset    No Known Problems Mother     No Known Problems Father     No Known Problems Sister     No Known Problems Brother     Diabetes Maternal Grandmother     Hypertension Maternal Grandmother     Diabetes Maternal Grandfather     Hypertension Maternal Grandfather     Hypertension Paternal Grandmother     Hypertension Paternal Grandfather        Social History:   Social History     Socioeconomic History    Marital status: Single   Tobacco Use    Smoking status: Former     Current packs/day: 0.00     Types: Cigarettes     Quit date:      Years since quitting: 3.5     Passive exposure: Never    Smokeless tobacco: Never    Tobacco comments:     (2021) Quit date of 2021   Vaping Use    Vaping status: Never Used   Substance and Sexual Activity    Alcohol use: No    Drug use: Not Currently     Types: Cocaine(coke), Marijuana      Comment: hx of sunbstance abuse    Sexual activity: Yes     Partners: Male     Birth control/protection: Birth control pill, Condom       Immunizations:   Immunization History   Administered Date(s) Administered    COVID-19 (PFIZER) Purple Cap Monovalent 03/10/2021, 04/07/2021    MMR 03/14/2014    Tdap 02/06/2019        Medications:     Current Outpatient Medications:     Biotin 1 MG capsule, Take 1 mg by mouth Daily., Disp: , Rfl:     cetirizine (zyrTEC) 10 MG tablet, Take 1 tablet by mouth Daily., Disp: , Rfl:     cholecalciferol (VITAMIN D3) 25 MCG (1000 UT) tablet, Take 1 tablet by mouth Daily., Disp: , Rfl:     cyclobenzaprine (FLEXERIL) 5 MG tablet, TAKE ONE TABLET BY MOUTH ONCE NIGHTLY AS NEEDED FOR MUSCLE SPASMS, Disp: 30 tablet, Rfl: 1    ibuprofen (ADVIL,MOTRIN) 600 MG tablet, Take 1 tablet by mouth Every 8 (Eight) Hours As Needed for Mild Pain ., Disp: 12 tablet, Rfl: 0    norethindrone (MICRONOR) 0.35 MG tablet, Take 1 tablet by mouth Daily., Disp: , Rfl:     phentermine (Adipex-P) 37.5 MG tablet, Take 1 tablet by mouth Every Morning Before Breakfast., Disp: 90 tablet, Rfl: 0    sertraline (Zoloft) 50 MG tablet, Take 1 tablet by mouth Daily., Disp: 90 tablet, Rfl: 2    SUMAtriptan (IMITREX) 50 MG tablet, TAKE 1 TABLET BY MOUTH AT ONSET OF HEADACHE; MAY REPEAT 1 TABLET IN 2 HOURS IF NEEDED., Disp: 9 tablet, Rfl: 1    topiramate (TOPAMAX) 25 MG tablet, TAKE 1 TABLET BY MOUTH DAILY, Disp: 90 tablet, Rfl: 3    Zinc 50 MG capsule, Take  by mouth., Disp: , Rfl:     cromolyn (OPTICROM) 4 % ophthalmic solution, Administer 1 drop to both eyes 4 (Four) Times a Day., Disp: 10 mL, Rfl: 12    olopatadine (PATANOL) 0.1 % ophthalmic solution, Administer 1 drop to both eyes 2 (Two) Times a Day. (Patient not taking: Reported on 7/3/2024), Disp: 5 mL, Rfl: 12    terbinafine (LamISIL AT) 1 % cream, Apply 1 Application topically to the appropriate area as directed 2 (Two) Times a Day. (Patient not taking: Reported on  "7/3/2024), Disp: 42 g, Rfl: 0    terbinafine (lamiSIL) 250 MG tablet, Take 1 tablet by mouth Daily. (Patient not taking: Reported on 7/3/2024), Disp: 90 tablet, Rfl: 1    Allergies:   No Known Allergies    Objective     Vital Signs  /72   Pulse 94   Temp 97.3 °F (36.3 °C) (Temporal)   Ht 162.6 cm (64.02\")   Wt 83.7 kg (184 lb 9.6 oz)   SpO2 99%   BMI 31.67 kg/m²   Estimated body mass index is 31.67 kg/m² as calculated from the following:    Height as of this encounter: 162.6 cm (64.02\").    Weight as of this encounter: 83.7 kg (184 lb 9.6 oz).            Physical Exam  Constitutional:       Appearance: Normal appearance. She is obese.   Cardiovascular:      Rate and Rhythm: Normal rate and regular rhythm.      Pulses: Normal pulses.      Heart sounds: Normal heart sounds.   Pulmonary:      Effort: Pulmonary effort is normal.      Breath sounds: Normal breath sounds.   Neurological:      Mental Status: She is alert.          Result Review :              CT CHEST WO CONTRAST DIAGNOSTIC    Date of Exam: 6/19/2024 9:28 AM EDT    Indication: Lung nodule, > 8mm.    Comparison: Chest CT 2/26/2024, CT 11/22/2023    Technique: Axial CT images were obtained of the chest without contrast administration.  Reconstructed coronal and sagittal images were also obtained. Automated exposure control and iterative construction methods were used.      Findings:  Thyroid unremarkable. No supraclavicular adenopathy. Normal course and caliber abdominal aorta. Normal caliber main pulmonary artery. Heart size normal. No pericardial effusion. Possible small sliding hiatal hernia. No suspicious mediastinal or hilar  adenopathy.    Trachea patent. No infectious consolidation, edema, effusion or pneumothorax. Stable 9 mm lingular nodule image 53 since 11/22/2023 imaging comparison. Stable 4 mm right lower lobe nodule image 47. Stable lymph node along the fissure on the right image  47.    No acute findings in the partially imaged " upper abdomen. Chest wall soft tissues unremarkable. No axillary adenopathy. No acute displaced fracture or aggressive lesion.   Impression:     Impression:  1. Stable 9 mm lingular nodule, now with documented 7-month stability. Given patient age this is almost certainly benign, favor infectious/inflammatory nodule.  2. No other suspicious pulmonary nodules or adenopathy.  3. No acute CT findings.       Assessment and Plan     1. Anxiety and depression  Stable  Continue with zoloft    2. Obesity (BMI 35.0-39.9 without comorbidity)  Continue with phentermine  Discussed risk associated with obesity. she was given instructions on calorie counting as well as on decreasing carbohydrate intake. she was also encouraged to start exercise regimen.  Instructed patient to download fitness dorian on her smart phone to aid in calorie counting and exercise tracking.         Follow Up  Return in about 3 months (around 10/3/2024) for Recheck.    Jones Rojo MD  [facility] [address]  [facility]  [address]  [address] [address] [phone]

## 2024-08-06 NOTE — PROGRESS NOTES
[facility]    CHIEF COMPLAINT    Pulmonary nodules and former smoking    HISTORY OF PRESENT ILLNESS    HPI:   Louise Colon is a 33 y.o.female followed by pulmonary regarding some bilateral pulmonary nodules.    She establish care with our pulmonary clinic in December 2023 and was seen by Dr. Washington.  In 2020 CT imaging revealed some small pulmonary nodules, however repeat scanning was delayed due to insurance issues and November 2023 CT imaging did show a new 9 mm ALLISON nodule as well as a stable 3 mm RLL nodule. She did contract COVID-19 twice in between these images.    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     CT of the chest from February 2024 and June 2024 displayed stability of the lingular and RLL nodules.    She is a former smoker who quit 3 years ago.    Interval history:   There have been no changes since her last evaluation.  She denies any respiratory illnesses.    Denies recent ED visits, hospitalizations, or exacerbations.     Denies fever, chills, night sweats, or hemoptysis. No recent sick contacts. No chest pain or palpitations. Denies lower extremity swelling or calf tenderness.     Patient Active Problem List   Diagnosis    FUNG (dyspnea on exertion)    Pericardial effusion    Elevated BP without diagnosis of hypertension    Chronic pain of left knee    Lumbar back pain    Obesity (BMI 35.0-39.9 without comorbidity)    Migraine without status migrainosus, not intractable    9 mm lingula nodule (November 2023)    Former smoker       No Known Allergies    Current Outpatient Medications:     neomycin-polymyxin-dexamethamethasone (POLYDEX) 3.5-60443-2.1 ointment ophthalmic ointment, , Disp: , Rfl:     tacrolimus (PROTOPIC) 0.03 % ointment, Use twice daily in and around eyelids, Disp: , Rfl:     triamcinolone (KENALOG) 0.025 % cream, Apply 1 Application topically to the appropriate area as directed., Disp: ,  Rfl:     Biotin 1 MG capsule, Take 1 mg by mouth Daily., Disp: , Rfl:     cetirizine (zyrTEC) 10 MG tablet, Take 1 tablet by mouth Daily., Disp: , Rfl:     cholecalciferol (VITAMIN D3) 25 MCG (1000 UT) tablet, Take 1 tablet by mouth Daily., Disp: , Rfl:     cromolyn (OPTICROM) 4 % ophthalmic solution, Administer 1 drop to both eyes 4 (Four) Times a Day., Disp: 10 mL, Rfl: 12    cyclobenzaprine (FLEXERIL) 5 MG tablet, TAKE ONE TABLET BY MOUTH ONCE NIGHTLY AS NEEDED FOR MUSCLE SPASMS, Disp: 30 tablet, Rfl: 1    ibuprofen (ADVIL,MOTRIN) 600 MG tablet, Take 1 tablet by mouth Every 8 (Eight) Hours As Needed for Mild Pain ., Disp: 12 tablet, Rfl: 0    norethindrone (MICRONOR) 0.35 MG tablet, Take 1 tablet by mouth Daily., Disp: , Rfl:     olopatadine (PATANOL) 0.1 % ophthalmic solution, Administer 1 drop to both eyes 2 (Two) Times a Day. (Patient not taking: Reported on 7/3/2024), Disp: 5 mL, Rfl: 12    phentermine (Adipex-P) 37.5 MG tablet, Take 1 tablet by mouth Every Morning Before Breakfast., Disp: 90 tablet, Rfl: 0    sertraline (Zoloft) 50 MG tablet, Take 1 tablet by mouth Daily., Disp: 90 tablet, Rfl: 2    SUMAtriptan (IMITREX) 50 MG tablet, TAKE 1 TABLET BY MOUTH AT ONSET OF HEADACHE; MAY REPEAT 1 TABLET IN 2 HOURS IF NEEDED., Disp: 9 tablet, Rfl: 1    terbinafine (LamISIL AT) 1 % cream, Apply 1 Application topically to the appropriate area as directed 2 (Two) Times a Day. (Patient not taking: Reported on 7/3/2024), Disp: 42 g, Rfl: 0    terbinafine (lamiSIL) 250 MG tablet, Take 1 tablet by mouth Daily. (Patient not taking: Reported on 7/3/2024), Disp: 90 tablet, Rfl: 1    topiramate (TOPAMAX) 25 MG tablet, TAKE 1 TABLET BY MOUTH DAILY, Disp: 90 tablet, Rfl: 3    Zinc 50 MG capsule, Take  by mouth., Disp: , Rfl:   MEDICATION LIST AND ALLERGIES REVIEWED.    Social History     Tobacco Use    Smoking status: Former     Current packs/day: 0.00     Types: Cigarettes     Quit date: 2021     Years since quitting: 3.6      "Passive exposure: Never    Smokeless tobacco: Never    Tobacco comments:     (12/29/2021) Quit date of 4/27/2021   Vaping Use    Vaping status: Never Used   Substance Use Topics    Alcohol use: No    Drug use: Not Currently     Types: Cocaine(coke), Marijuana     Comment: hx of sunbstance abuse       FAMILY AND SOCIAL HISTORY REVIEWED.    Review of Systems   Constitutional:  Negative for chills, fatigue and fever.   Respiratory:  Negative for cough, chest tightness, shortness of breath and wheezing.    Cardiovascular:  Negative for chest pain, palpitations and leg swelling.   Skin:  Negative for color change.   Psychiatric/Behavioral:  Negative for sleep disturbance.    All other systems reviewed and are negative.  .    /76   Pulse 109   Temp 98 °F (36.7 °C)   Ht 162.6 cm (64.02\")   Wt 83.9 kg (185 lb)   SpO2 99% Comment: room air at rest  BMI 31.74 kg/m²     Immunization History   Administered Date(s) Administered    COVID-19 (PFIZER) Purple Cap Monovalent 03/10/2021, 04/07/2021    MMR 03/14/2014    Tdap 02/06/2019       Physical Exam  Vitals reviewed.   Constitutional:       General: She is not in acute distress.     Appearance: Normal appearance.   Cardiovascular:      Rate and Rhythm: Normal rate and regular rhythm. Tachycardia present.      Pulses: Normal pulses.      Heart sounds: Normal heart sounds.   Pulmonary:      Effort: Pulmonary effort is normal. No respiratory distress.      Breath sounds: No wheezing, rhonchi or rales.   Skin:     General: Skin is warm and dry.   Neurological:      Mental Status: She is alert.       RESULTS    PFTs:  12/2023: No airway obstruction.  No restriction.  Normal DLCO.    Imaging:   CT chest without contrast 6/19/2024:  Impression:  1. Stable 9 mm lingular nodule, now with documented 7-month stability. Given patient age this is almost certainly benign, favor infectious/inflammatory nodule.  2. No other suspicious pulmonary nodules or adenopathy.  3. No acute CT " findings.    PROBLEM LIST    Problem List Items Addressed This Visit       9 mm lingula nodule (November 2023) - Primary    Former smoker     DISCUSSION    Ms. Baker was seen today for follow-up and is stable from a pulmonary standpoint.    Based on her CT imaging, her pulmonary nodules have remained stable.  She denies any respiratory issues.    9 mm lingular nodule  4 mm RLL nodule  Former tobacco abuse  At this point the 9 mm lingular nodule has been stable for 7 months  Serial CT of the chest in place and due June 2025  Will continue to monitor this for a total of 2 years to ensure stability  Should the nodule enlarge further or become more dense I do recommend proceeding with a biopsy-she is agreeable to this.  She is a former smoker who quit 3 years ago.  Ongoing cessation is highly encouraged.  I will repeat PFTs at her next visit.  I do encourage her to remain up-to-date on her vaccinations.    Return to clinic in 1 year with PFTs or sooner if needed.    I personally spent a total of 31 minutes on patient visit today including chart review, face to face with the patient obtaining the history and physical exam, review of pertinent images and tests, counseling and discussion and/or coordination of care as described above, and documentation.  Total time excludes time spent on other separate services such as performing procedures or test interpretation, if applicable.    Electronically signed by LI Lopez, 08/07/24, 8:54 AM EDT.    Please note that portions of this note were completed with a voice recognition program.      CC: Jones Rojo MD

## 2024-08-07 ENCOUNTER — OFFICE VISIT (OUTPATIENT)
Dept: PULMONOLOGY | Facility: CLINIC | Age: 34
End: 2024-08-07
Payer: COMMERCIAL

## 2024-08-07 VITALS
SYSTOLIC BLOOD PRESSURE: 118 MMHG | HEIGHT: 64 IN | BODY MASS INDEX: 31.58 KG/M2 | OXYGEN SATURATION: 99 % | TEMPERATURE: 98 F | DIASTOLIC BLOOD PRESSURE: 76 MMHG | HEART RATE: 109 BPM | WEIGHT: 185 LBS

## 2024-08-07 DIAGNOSIS — R91.1 LUNG NODULE: Primary | ICD-10-CM

## 2024-08-07 DIAGNOSIS — Z87.891 FORMER SMOKER: ICD-10-CM

## 2024-08-07 PROCEDURE — 99214 OFFICE O/P EST MOD 30 MIN: CPT | Performed by: NURSE PRACTITIONER

## 2024-08-07 RX ORDER — TACROLIMUS 0.3 MG/G
OINTMENT TOPICAL
COMMUNITY
Start: 2024-05-01

## 2024-08-07 RX ORDER — TRIAMCINOLONE ACETONIDE 0.25 MG/G
1 CREAM TOPICAL
COMMUNITY
Start: 2024-04-11

## 2024-08-07 RX ORDER — NEOMYCIN SULFATE, POLYMYXIN B SULFATE, AND DEXAMETHASONE 3.5; 10000; 1 MG/G; [USP'U]/G; MG/G
OINTMENT OPHTHALMIC
COMMUNITY
Start: 2024-04-23

## 2024-08-17 DIAGNOSIS — F41.9 ANXIETY AND DEPRESSION: ICD-10-CM

## 2024-08-17 DIAGNOSIS — F32.A ANXIETY AND DEPRESSION: ICD-10-CM

## 2024-08-18 DIAGNOSIS — G43.909 MIGRAINE WITHOUT STATUS MIGRAINOSUS, NOT INTRACTABLE, UNSPECIFIED MIGRAINE TYPE: ICD-10-CM

## 2024-08-19 RX ORDER — SUMATRIPTAN 50 MG/1
TABLET, FILM COATED ORAL
Qty: 9 TABLET | Refills: 1 | Status: SHIPPED | OUTPATIENT
Start: 2024-08-19

## 2024-08-21 ENCOUNTER — TELEPHONE (OUTPATIENT)
Dept: INTERNAL MEDICINE | Facility: CLINIC | Age: 34
End: 2024-08-21

## 2024-08-23 ENCOUNTER — TELEPHONE (OUTPATIENT)
Dept: INTERNAL MEDICINE | Facility: CLINIC | Age: 34
End: 2024-08-23

## 2024-08-26 DIAGNOSIS — Z13.1 SCREENING FOR DIABETES MELLITUS: Primary | ICD-10-CM

## 2024-08-27 ENCOUNTER — CLINICAL SUPPORT (OUTPATIENT)
Dept: INTERNAL MEDICINE | Facility: CLINIC | Age: 34
End: 2024-08-27
Payer: COMMERCIAL

## 2024-08-27 DIAGNOSIS — Z13.1 SCREENING FOR DIABETES MELLITUS: ICD-10-CM

## 2024-08-27 LAB
EXPIRATION DATE: NORMAL
HBA1C MFR BLD: 5.2 % (ref 4.5–5.7)
Lab: NORMAL

## 2024-08-27 PROCEDURE — 3044F HG A1C LEVEL LT 7.0%: CPT | Performed by: STUDENT IN AN ORGANIZED HEALTH CARE EDUCATION/TRAINING PROGRAM

## 2024-08-27 PROCEDURE — 83036 HEMOGLOBIN GLYCOSYLATED A1C: CPT | Performed by: STUDENT IN AN ORGANIZED HEALTH CARE EDUCATION/TRAINING PROGRAM

## 2024-09-22 DIAGNOSIS — M54.50 LUMBAR BACK PAIN: ICD-10-CM

## 2024-09-23 RX ORDER — CYCLOBENZAPRINE HCL 5 MG
TABLET ORAL
Qty: 30 TABLET | Refills: 1 | Status: SHIPPED | OUTPATIENT
Start: 2024-09-23

## 2024-10-03 DIAGNOSIS — E66.9 OBESITY (BMI 35.0-39.9 WITHOUT COMORBIDITY): ICD-10-CM

## 2024-10-03 RX ORDER — PHENTERMINE HYDROCHLORIDE 37.5 MG/1
37.5 TABLET ORAL
Qty: 90 TABLET | OUTPATIENT
Start: 2024-10-03

## 2024-10-04 DIAGNOSIS — E66.9 OBESITY (BMI 35.0-39.9 WITHOUT COMORBIDITY): Primary | ICD-10-CM

## 2024-10-04 RX ORDER — PHENTERMINE HYDROCHLORIDE 37.5 MG/1
37.5 TABLET ORAL
Qty: 90 TABLET | Refills: 0 | Status: SHIPPED | OUTPATIENT
Start: 2024-10-04

## 2024-10-04 NOTE — TELEPHONE ENCOUNTER
Caller: Louise Colon    Relationship: Self    Best call back number:      Requested Prescriptions:   Requested Prescriptions     Refused Prescriptions Disp Refills    phentermine (ADIPEX-P) 37.5 MG tablet [Pharmacy Med Name: PHENTERMINE 37.5 MG TABLET] 90 tablet      Sig: TAKE ONE TABLET BY MOUTH EVERY MORNING BEFORE BREAKFAST     Refused By: ETHAN RAMACHANDRAN     Reason for Refusal: Refill not appropriate        Pharmacy where request should be sent: [facility] [id number] [place] [address] [address] AT [facility] [phone] [facility] [phone] FX     Last office visit with prescribing clinician: 7/3/2024   Last telemedicine visit with prescribing clinician: Visit date not found   Next office visit with prescribing clinician: 10/9/2024     Additional details provided by patient: PATIENT IS OUT OF MEDICATION AND WISHES TO RESTART THIS MEDICATION; THERE IS ALSO A MESSAGE VIA MY CHART    Does the patient have less than a 3 day supply:  [x] Yes  [] No      Jacky Shearer Rep   10/04/24 10:51 EDT

## 2024-10-09 ENCOUNTER — OFFICE VISIT (OUTPATIENT)
Dept: INTERNAL MEDICINE | Facility: CLINIC | Age: 34
End: 2024-10-09
Payer: COMMERCIAL

## 2024-10-09 VITALS
DIASTOLIC BLOOD PRESSURE: 78 MMHG | WEIGHT: 194 LBS | TEMPERATURE: 98.4 F | HEART RATE: 97 BPM | HEIGHT: 64 IN | SYSTOLIC BLOOD PRESSURE: 122 MMHG | BODY MASS INDEX: 33.12 KG/M2 | OXYGEN SATURATION: 99 %

## 2024-10-09 DIAGNOSIS — F41.9 ANXIETY AND DEPRESSION: ICD-10-CM

## 2024-10-09 DIAGNOSIS — F32.A ANXIETY AND DEPRESSION: ICD-10-CM

## 2024-10-09 DIAGNOSIS — E66.9 OBESITY (BMI 35.0-39.9 WITHOUT COMORBIDITY): Primary | ICD-10-CM

## 2024-10-09 DIAGNOSIS — J04.0 LARYNGITIS DUE TO GASTROESOPHAGEAL REFLUX: ICD-10-CM

## 2024-10-09 DIAGNOSIS — K21.9 LARYNGITIS DUE TO GASTROESOPHAGEAL REFLUX: ICD-10-CM

## 2024-10-09 PROCEDURE — 99214 OFFICE O/P EST MOD 30 MIN: CPT | Performed by: STUDENT IN AN ORGANIZED HEALTH CARE EDUCATION/TRAINING PROGRAM

## 2024-10-09 PROCEDURE — 1160F RVW MEDS BY RX/DR IN RCRD: CPT | Performed by: STUDENT IN AN ORGANIZED HEALTH CARE EDUCATION/TRAINING PROGRAM

## 2024-10-09 PROCEDURE — 1159F MED LIST DOCD IN RCRD: CPT | Performed by: STUDENT IN AN ORGANIZED HEALTH CARE EDUCATION/TRAINING PROGRAM

## 2024-10-09 PROCEDURE — 1125F AMNT PAIN NOTED PAIN PRSNT: CPT | Performed by: STUDENT IN AN ORGANIZED HEALTH CARE EDUCATION/TRAINING PROGRAM

## 2024-10-09 RX ORDER — CETIRIZINE HYDROCHLORIDE, PSEUDOEPHEDRINE HYDROCHLORIDE 5; 120 MG/1; MG/1
1 TABLET, FILM COATED, EXTENDED RELEASE ORAL 2 TIMES DAILY
Qty: 60 TABLET | Refills: 5 | Status: SHIPPED | OUTPATIENT
Start: 2024-10-09

## 2024-10-09 RX ORDER — OMEPRAZOLE 40 MG/1
40 CAPSULE, DELAYED RELEASE ORAL DAILY
Qty: 90 CAPSULE | Refills: 2 | Status: SHIPPED | OUTPATIENT
Start: 2024-10-09

## 2024-10-09 NOTE — PROGRESS NOTES
Office Note     Name: Louise Colon    : 1990     MRN: [id number]     Chief Complaint  Anxiety and Depression    Subjective     History of Present Illness:  Louise Colon is a 33 y.o. female who presents today for       Depression/anxiety  Currently on zoloft 50 mg daily,   Her anxiety and depression have been controlled        Weight management  Current weight 180  Has gained about 194lbs  She has been lax on her diet, she is trying to focus on high protein quality meals.  She makes healthy protein smoothies with fruit.  She joined weight watchers.     Has been waking up with sore throat in the mornings, describe as feeling like an acid taste.  She does take a daily zrytec, worse in the morning and gets better throughout the day  Her last CT lungs showed a possible sliding hiatal hernia.       Review of Systems:   Review of Systems   All other systems reviewed and are negative.      Past Medical History:   Past Medical History:   Diagnosis Date    Anxiety     Arthritis     Depression     Headache     Obesity        Past Surgical History:   Past Surgical History:   Procedure Laterality Date    FEMUR FRACTURE SURGERY      KNEE SURGERY      MANDIBLE SURGERY         Family History:   Family History   Problem Relation Age of Onset    No Known Problems Mother     No Known Problems Father     No Known Problems Sister     No Known Problems Brother     Diabetes Maternal Grandmother     Hypertension Maternal Grandmother     Diabetes Maternal Grandfather     Hypertension Maternal Grandfather     Hypertension Paternal Grandmother     Hypertension Paternal Grandfather        Social History:   Social History     Socioeconomic History    Marital status: Single   Tobacco Use    Smoking status: Former     Current packs/day: 0.00     Average packs/day: 1 pack/day for 10.0 years (10.0 ttl pk-yrs)     Types: Cigarettes     Quit date: 2021     Years since quitting: 3.4     Passive exposure: Never    Smokeless tobacco:  Refill Decision Note   Mable Mann  is requesting a refill authorization.  Brief Assessment and Rationale for Refill:  Approve     Medication Therapy Plan:       Medication Reconciliation Completed: No   Comments:     No Care Gaps recommended.     Note composed:12:47 PM 06/03/2024           Never    Tobacco comments:     Started smoking in high school   Vaping Use    Vaping status: Never Used   Substance and Sexual Activity    Alcohol use: No    Drug use: Never     Types: Cocaine(coke), Marijuana     Comment: hx of sunbstance abuse    Sexual activity: Yes     Partners: Male     Birth control/protection: Condom, Pill       Immunizations:   Immunization History   Administered Date(s) Administered    COVID-19 (PFIZER) Purple Cap Monovalent 03/10/2021, 04/07/2021    MMR 03/14/2014    Tdap 02/06/2019        Medications:     Current Outpatient Medications:     Biotin 1 MG capsule, Take 1 mg by mouth Daily., Disp: , Rfl:     cetirizine (zyrTEC) 10 MG tablet, Take 1 tablet by mouth Daily., Disp: , Rfl:     cholecalciferol (VITAMIN D3) 25 MCG (1000 UT) tablet, Take 1 tablet by mouth Daily., Disp: , Rfl:     cyclobenzaprine (FLEXERIL) 5 MG tablet, TAKE ONE TABLET BY MOUTH NIGHTLY AS NEEDED FOR MUSCLE SPASMS, Disp: 30 tablet, Rfl: 1    ibuprofen (ADVIL,MOTRIN) 600 MG tablet, Take 1 tablet by mouth Every 8 (Eight) Hours As Needed for Mild Pain ., Disp: 12 tablet, Rfl: 0    norethindrone (MICRONOR) 0.35 MG tablet, Take 1 tablet by mouth Daily., Disp: , Rfl:     phentermine (Adipex-P) 37.5 MG tablet, Take 1 tablet by mouth Every Morning Before Breakfast., Disp: 90 tablet, Rfl: 0    sertraline (ZOLOFT) 50 MG tablet, TAKE 1 TABLET BY MOUTH DAILY, Disp: 90 tablet, Rfl: 2    SUMAtriptan (IMITREX) 50 MG tablet, TAKE 1 TABLET BY MOUTH AT ONSET OF HEADACHE; MAY REPEAT 1 TABLET IN 2 HOURS IF NEEDED., Disp: 9 tablet, Rfl: 1    tacrolimus (PROTOPIC) 0.03 % ointment, Use twice daily in and around eyelids, Disp: , Rfl:     topiramate (TOPAMAX) 25 MG tablet, TAKE 1 TABLET BY MOUTH DAILY, Disp: 90 tablet, Rfl: 3    triamcinolone (KENALOG) 0.025 % cream, Apply 1 Application topically to the appropriate area as directed., Disp: , Rfl:     Zinc 50 MG capsule, Take  by mouth., Disp: , Rfl:     cetirizine-pseudoephedrine (ZyrTEC-D)  "5-120 MG per 12 hr tablet, Take 1 tablet by mouth 2 (Two) Times a Day., Disp: 60 tablet, Rfl: 5    omeprazole (priLOSEC) 40 MG capsule, Take 1 capsule by mouth Daily., Disp: 90 capsule, Rfl: 2    Allergies:   No Known Allergies    Objective     Vital Signs  /78   Pulse 97   Temp 98.4 °F (36.9 °C) (Temporal)   Ht 162.6 cm (64.02\")   Wt 88 kg (194 lb)   SpO2 99%   BMI 33.28 kg/m²   Estimated body mass index is 33.28 kg/m² as calculated from the following:    Height as of this encounter: 162.6 cm (64.02\").    Weight as of this encounter: 88 kg (194 lb).            Physical Exam  Constitutional:       Appearance: Normal appearance. She is obese.   Cardiovascular:      Rate and Rhythm: Normal rate and regular rhythm.      Pulses: Normal pulses.      Heart sounds: Normal heart sounds.   Pulmonary:      Effort: Pulmonary effort is normal.      Breath sounds: Normal breath sounds.   Neurological:      Mental Status: She is alert.          Result Review :                  Assessment and Plan     1. Obesity (BMI 35.0-39.9 without comorbidity)  Adipex sent to pharmacy, 3 months supply  Discussed risk associated with obesity. she was given instructions on calorie counting as well as on decreasing carbohydrate intake. she was also encouraged to start exercise regimen.  Instructed patient to download fitness dorian on her smart phone to aid in calorie counting and exercise tracking.    - Compliance Drug Analysis, Ur - Urine, Clean Catch; Future  - Compliance Drug Analysis, Ur - Urine, Clean Catch    2. Anxiety and depression  Controlled on zoloft 50mg daily    3. Laryngitis due to gastroesophageal reflux    - cetirizine-pseudoephedrine (ZyrTEC-D) 5-120 MG per 12 hr tablet; Take 1 tablet by mouth 2 (Two) Times a Day.  Dispense: 60 tablet; Refill: 5  - omeprazole (priLOSEC) 40 MG capsule; Take 1 capsule by mouth Daily.  Dispense: 90 capsule; Refill: 2       Follow Up  No follow-ups on file.    Jones Rojo MD  [facility] " [address]  [facility]  [address]  [address] [id number] [phone]

## 2024-10-18 LAB
DRUGS UR: NORMAL
Lab: NORMAL

## 2024-12-12 DIAGNOSIS — E66.9 OBESITY (BMI 35.0-39.9 WITHOUT COMORBIDITY): ICD-10-CM

## 2024-12-12 DIAGNOSIS — M54.50 LUMBAR BACK PAIN: ICD-10-CM

## 2024-12-12 NOTE — TELEPHONE ENCOUNTER
REFILL REQUEST PHENTERMINE   LAST REFILL 10/4/24    CYCLOBENZAPRINE   LAST REFILL 9/23/24  LAST VISIT 7/3/24  NEXT VISIT 3/5/25

## 2024-12-13 RX ORDER — CYCLOBENZAPRINE HCL 5 MG
5 TABLET ORAL NIGHTLY PRN
Qty: 30 TABLET | Refills: 1 | Status: SHIPPED | OUTPATIENT
Start: 2024-12-13

## 2024-12-13 RX ORDER — PHENTERMINE HYDROCHLORIDE 37.5 MG/1
37.5 TABLET ORAL
Qty: 90 TABLET | Refills: 0 | Status: SHIPPED | OUTPATIENT
Start: 2024-12-13

## 2024-12-22 DIAGNOSIS — E66.9 OBESITY (BMI 35.0-39.9 WITHOUT COMORBIDITY): ICD-10-CM

## 2024-12-23 RX ORDER — PHENTERMINE HYDROCHLORIDE 37.5 MG/1
37.5 TABLET ORAL
Qty: 90 TABLET | Refills: 0 | OUTPATIENT
Start: 2024-12-23

## 2025-01-03 DIAGNOSIS — G43.909 MIGRAINE WITHOUT STATUS MIGRAINOSUS, NOT INTRACTABLE, UNSPECIFIED MIGRAINE TYPE: ICD-10-CM

## 2025-01-07 RX ORDER — TOPIRAMATE 25 MG/1
25 TABLET, FILM COATED ORAL DAILY
Qty: 90 TABLET | Refills: 3 | Status: SHIPPED | OUTPATIENT
Start: 2025-01-07

## 2025-02-15 DIAGNOSIS — M54.50 LUMBAR BACK PAIN: ICD-10-CM

## 2025-02-18 RX ORDER — CYCLOBENZAPRINE HCL 5 MG
TABLET ORAL
Qty: 30 TABLET | Refills: 5 | Status: SHIPPED | OUTPATIENT
Start: 2025-02-18

## 2025-03-05 ENCOUNTER — OFFICE VISIT (OUTPATIENT)
Dept: INTERNAL MEDICINE | Facility: CLINIC | Age: 35
End: 2025-03-05
Payer: COMMERCIAL

## 2025-03-05 VITALS
HEIGHT: 64 IN | SYSTOLIC BLOOD PRESSURE: 126 MMHG | TEMPERATURE: 98 F | DIASTOLIC BLOOD PRESSURE: 80 MMHG | WEIGHT: 187.4 LBS | HEART RATE: 94 BPM | BODY MASS INDEX: 31.99 KG/M2 | OXYGEN SATURATION: 99 %

## 2025-03-05 DIAGNOSIS — F32.A ANXIETY AND DEPRESSION: Primary | ICD-10-CM

## 2025-03-05 DIAGNOSIS — F41.9 ANXIETY AND DEPRESSION: Primary | ICD-10-CM

## 2025-03-05 DIAGNOSIS — G43.909 MIGRAINE WITHOUT STATUS MIGRAINOSUS, NOT INTRACTABLE, UNSPECIFIED MIGRAINE TYPE: ICD-10-CM

## 2025-03-05 DIAGNOSIS — E66.9 OBESITY (BMI 35.0-39.9 WITHOUT COMORBIDITY): ICD-10-CM

## 2025-03-05 PROCEDURE — 1159F MED LIST DOCD IN RCRD: CPT | Performed by: STUDENT IN AN ORGANIZED HEALTH CARE EDUCATION/TRAINING PROGRAM

## 2025-03-05 PROCEDURE — 99214 OFFICE O/P EST MOD 30 MIN: CPT | Performed by: STUDENT IN AN ORGANIZED HEALTH CARE EDUCATION/TRAINING PROGRAM

## 2025-03-05 PROCEDURE — 1125F AMNT PAIN NOTED PAIN PRSNT: CPT | Performed by: STUDENT IN AN ORGANIZED HEALTH CARE EDUCATION/TRAINING PROGRAM

## 2025-03-05 PROCEDURE — 1160F RVW MEDS BY RX/DR IN RCRD: CPT | Performed by: STUDENT IN AN ORGANIZED HEALTH CARE EDUCATION/TRAINING PROGRAM

## 2025-03-05 RX ORDER — HYDROXYZINE HYDROCHLORIDE 25 MG/1
25 TABLET, FILM COATED ORAL EVERY 6 HOURS PRN
Qty: 30 TABLET | Refills: 2 | Status: SHIPPED | OUTPATIENT
Start: 2025-03-05

## 2025-03-05 NOTE — PROGRESS NOTES
Office Note     Name: Louise Colon    : 1990     MRN: [id number]     Chief Complaint  Anxiety, Depression, and Obesity    Subjective     History of Present Illness:  Louise Colon is a 34 y.o. female who presents today for       Depression/anxiety  Currently on zoloft 50 mg daily,   Her anxiety and depression have been controlled       Weight management  Current weight 187  Previous 192  She has been lax on her diet, she is trying to focus on high protein quality meals.  She makes healthy protein smoothies with fruit.  She joined weight watchers.    Wellbutrin,     Migraine headaches  Has been controlled on medication. She is on topamax, uses imitrex PRN        Review of Systems:   Review of Systems   All other systems reviewed and are negative.      Past Medical History:   Past Medical History:   Diagnosis Date    Anxiety     Arthritis     Depression     Headache     Obesity        Past Surgical History:   Past Surgical History:   Procedure Laterality Date    FEMUR FRACTURE SURGERY      KNEE SURGERY      MANDIBLE SURGERY         Family History:   Family History   Problem Relation Age of Onset    No Known Problems Mother     No Known Problems Father     No Known Problems Sister     No Known Problems Brother     Diabetes Maternal Grandmother     Hypertension Maternal Grandmother     Diabetes Maternal Grandfather     Hypertension Maternal Grandfather     Hypertension Paternal Grandmother     Hypertension Paternal Grandfather        Social History:   Social History     Socioeconomic History    Marital status: Single   Tobacco Use    Smoking status: Former     Current packs/day: 0.00     Average packs/day: 1 pack/day for 10.0 years (10.0 ttl pk-yrs)     Types: Cigarettes     Quit date: 2021     Years since quitting: 3.9     Passive exposure: Never    Smokeless tobacco: Never    Tobacco comments:     Started smoking in high school   Vaping Use    Vaping status: Never Used   Substance and Sexual  Activity    Alcohol use: No    Drug use: Never     Types: Cocaine(coke), Marijuana     Comment: hx of sunbstance abuse    Sexual activity: Yes     Partners: Male     Birth control/protection: Condom, Pill       Immunizations:   Immunization History   Administered Date(s) Administered    COVID-19 (PFIZER) Purple Cap Monovalent 03/10/2021, 04/07/2021    MMR 03/14/2014    Tdap 02/06/2019        Medications:     Current Outpatient Medications:     cetirizine-pseudoephedrine (ZyrTEC-D) 5-120 MG per 12 hr tablet, Take 1 tablet by mouth 2 (Two) Times a Day., Disp: 60 tablet, Rfl: 5    cholecalciferol (VITAMIN D3) 25 MCG (1000 UT) tablet, Take 1 tablet by mouth Daily., Disp: , Rfl:     cyclobenzaprine (FLEXERIL) 5 MG tablet, TAKE 1 TABLET BY MOUTH EVERY NIGHT AT BEDTIME AS NEEDED FOR MUSCLE SPASMS, Disp: 30 tablet, Rfl: 5    ibuprofen (ADVIL,MOTRIN) 600 MG tablet, Take 1 tablet by mouth Every 8 (Eight) Hours As Needed for Mild Pain ., Disp: 12 tablet, Rfl: 0    norethindrone (MICRONOR) 0.35 MG tablet, Take 1 tablet by mouth Daily., Disp: , Rfl:     omeprazole (priLOSEC) 40 MG capsule, Take 1 capsule by mouth Daily., Disp: 90 capsule, Rfl: 2    sertraline (ZOLOFT) 50 MG tablet, TAKE 1 TABLET BY MOUTH DAILY, Disp: 90 tablet, Rfl: 2    SUMAtriptan (IMITREX) 50 MG tablet, TAKE 1 TABLET BY MOUTH AT ONSET OF HEADACHE; MAY REPEAT 1 TABLET IN 2 HOURS IF NEEDED., Disp: 9 tablet, Rfl: 1    tacrolimus (PROTOPIC) 0.03 % ointment, Use twice daily in and around eyelids, Disp: , Rfl:     topiramate (TOPAMAX) 25 MG tablet, TAKE 1 TABLET BY MOUTH DAILY, Disp: 90 tablet, Rfl: 3    triamcinolone (KENALOG) 0.025 % cream, Apply 1 Application topically to the appropriate area as directed., Disp: , Rfl:     Zinc 50 MG capsule, Take  by mouth., Disp: , Rfl:     hydrOXYzine (ATARAX) 25 MG tablet, Take 1 tablet by mouth Every 6 (Six) Hours As Needed for Anxiety., Disp: 30 tablet, Rfl: 2    Allergies:   No Known Allergies    Objective     Vital  "Signs  /80   Pulse 94   Temp 98 °F (36.7 °C) (Temporal)   Ht 162.6 cm (64.02\")   Wt 85 kg (187 lb 6.4 oz)   SpO2 99%   BMI 32.15 kg/m²   Estimated body mass index is 32.15 kg/m² as calculated from the following:    Height as of this encounter: 162.6 cm (64.02\").    Weight as of this encounter: 85 kg (187 lb 6.4 oz).            Physical Exam  Constitutional:       Appearance: Normal appearance. She is obese.   Cardiovascular:      Rate and Rhythm: Normal rate and regular rhythm.      Pulses: Normal pulses.      Heart sounds: Normal heart sounds.   Pulmonary:      Effort: Pulmonary effort is normal.      Breath sounds: Normal breath sounds.   Neurological:      Mental Status: She is alert.          Result Review :                  Assessment and Plan     1. Anxiety and depression  Controlled  Continue with zoloft 50mg daily  - Ambulatory Referral to Behavioral Health  - hydrOXYzine (ATARAX) 25 MG tablet; Take 1 tablet by mouth Every 6 (Six) Hours As Needed for Anxiety.  Dispense: 30 tablet; Refill: 2    2. Obesity (BMI 35.0-39.9 without comorbidity)  Discussed risk associated with obesity. she was given instructions on calorie counting as well as on decreasing carbohydrate intake. she was also encouraged to start exercise regimen.  Instructed patient to download fitness dorian on her smart phone to aid in calorie counting and exercise tracking.      3. Migraine without status migrainosus, not intractable, unspecified migraine type  Continue with topamax  Imitrex prn       Follow Up  Return in about 6 months (around 9/5/2025).    Jones Rojo MD  [facility]  [address]  [facility]  [address]  [address] [id number] [phone]    "

## 2025-04-04 DIAGNOSIS — F32.A ANXIETY AND DEPRESSION: ICD-10-CM

## 2025-04-04 DIAGNOSIS — F41.9 ANXIETY AND DEPRESSION: ICD-10-CM

## 2025-04-04 DIAGNOSIS — G43.909 MIGRAINE WITHOUT STATUS MIGRAINOSUS, NOT INTRACTABLE, UNSPECIFIED MIGRAINE TYPE: ICD-10-CM

## 2025-04-07 RX ORDER — SUMATRIPTAN 50 MG/1
TABLET, FILM COATED ORAL
Qty: 27 TABLET | Refills: 1 | Status: SHIPPED | OUTPATIENT
Start: 2025-04-07

## 2025-04-22 NOTE — PROGRESS NOTES
New Patient Pulmonary Office Visit      Patient Name: Louise Colon    Referring Physician: Lubna Dietrich DO    Chief Complaint:    Chief Complaint   Patient presents with    Lung Nodule     New patient        History of Present Illness: Louise Colon is a 32 y.o. female who is here today to establish care with Pulmonary.  Patient with a past medical history significant for migraines.  Was referred to pulmonary for evaluation of pulmonary nodule.  Patient was having some medical issues back in 2020, had a CT scan showing some small nodules.  She had lost her insurance did not follow-up on those nodules and then had a repeat CT scan done recently after she had saw her PCP and found to have a new 9 mm lingula nodule.  Denies any chest pain, nausea, fever, or chills.  Did have COVID twice in between the 2 CT scans.  No other acute complaints.    Review of Systems:   Review of Systems   Constitutional:  Negative for activity change, appetite change, chills and diaphoresis.   HENT:  Negative for congestion, postnasal drip, sinus pressure and voice change.    Eyes:  Negative for blurred vision.   Respiratory:  Negative for cough, shortness of breath and wheezing.    Cardiovascular:  Negative for chest pain.   Gastrointestinal:  Negative for abdominal pain.   Musculoskeletal:  Negative for myalgias.   Skin:  Negative for color change and dry skin.   Allergic/Immunologic: Negative for environmental allergies.   Neurological:  Negative for weakness and confusion.   Hematological:  Negative for adenopathy.   Psychiatric/Behavioral:  Negative for sleep disturbance and depressed mood.        Past Medical History:   Past Medical History:   Diagnosis Date    Anxiety     Arthritis     Depression     Headache        Past Surgical History:   Past Surgical History:   Procedure Laterality Date    FEMUR FRACTURE SURGERY      KNEE SURGERY      MANDIBLE SURGERY         Family History:   Family History   Problem Relation Age of Onset  Detail Level: Detailed    No Known Problems Mother     No Known Problems Father     No Known Problems Sister     No Known Problems Brother     Diabetes Maternal Grandmother     Hypertension Maternal Grandmother     Diabetes Maternal Grandfather     Hypertension Maternal Grandfather     Hypertension Paternal Grandmother     Hypertension Paternal Grandfather        Social History:   Social History     Socioeconomic History    Marital status: Single   Tobacco Use    Smoking status: Former     Packs/day: .25     Types: Cigarettes     Passive exposure: Never    Smokeless tobacco: Never    Tobacco comments:     (12/29/2021) Quit date of 4/27/2021   Vaping Use    Vaping Use: Never used   Substance and Sexual Activity    Alcohol use: No    Drug use: Not Currently     Types: Cocaine(coke), Marijuana     Comment: hx of sunbstance abuse    Sexual activity: Yes     Partners: Male     Birth control/protection: Birth control pill, Condom       Medications:     Current Outpatient Medications:     Biotin 1 MG capsule, Take 1 mg by mouth Daily., Disp: , Rfl:     cholecalciferol (VITAMIN D3) 25 MCG (1000 UT) tablet, Take 1 tablet by mouth Daily., Disp: , Rfl:     cyclobenzaprine (FLEXERIL) 5 MG tablet, TAKE ONE TO TWO TABLETS BY MOUTH TWO TIMES A DAY AS NEEDED FOR MUSCLE SPASMS, Disp: 30 tablet, Rfl: 1    ibuprofen (ADVIL,MOTRIN) 600 MG tablet, Take 1 tablet by mouth Every 8 (Eight) Hours As Needed for Mild Pain ., Disp: 12 tablet, Rfl: 0    norethindrone (MICRONOR) 0.35 MG tablet, Take 1 tablet by mouth Daily., Disp: , Rfl:     phentermine (ADIPEX-P) 37.5 MG tablet, Take 1 tablet by mouth Every Morning Before Breakfast., Disp: 30 tablet, Rfl: 0    sertraline (Zoloft) 50 MG tablet, Take 1 tablet by mouth Daily., Disp: 90 tablet, Rfl: 2    SUMAtriptan (Imitrex) 50 MG tablet, Take one tablet at onset of headache. May repeat dose one time in 2 hours if headache not relieved., Disp: 9 tablet, Rfl: 1    topiramate (Topamax) 25 MG tablet, Take 1 tablet by mouth  Add 32311 Cpt? (Important Note: In 2017 The Use Of 48249 Is Being Tracked By Cms To Determine Future Global Period Reimbursement For Global Periods): no "Daily., Disp: 90 tablet, Rfl: 1    Zinc 50 MG capsule, Take  by mouth., Disp: , Rfl:     Allergies:   No Known Allergies    Physical Exam:  Vital Signs:   Vitals:    12/05/23 0836   BP: 126/70   BP Location: Left arm   Patient Position: Sitting   Cuff Size: Adult   Pulse: 101   Temp: 98.6 °F (37 °C)   TempSrc: Infrared   SpO2: 99%   Weight: 48.5 kg (107 lb)   Height: 162.6 cm (64.02\")       Physical Exam  Vitals and nursing note reviewed.   Constitutional:       General: She is not in acute distress.     Appearance: She is well-developed and normal weight. She is not ill-appearing or toxic-appearing.   HENT:      Head: Normocephalic and atraumatic.   Cardiovascular:      Rate and Rhythm: Normal rate and regular rhythm.      Pulses: Normal pulses.      Heart sounds: Normal heart sounds. No murmur heard.     No friction rub. No gallop.   Pulmonary:      Effort: Pulmonary effort is normal. No respiratory distress.      Breath sounds: Normal breath sounds. No wheezing, rhonchi or rales.   Musculoskeletal:      Right lower leg: No edema.      Left lower leg: No edema.   Skin:     General: Skin is warm and dry.   Neurological:      Mental Status: She is alert and oriented to person, place, and time.         Immunization History   Administered Date(s) Administered    COVID-19 (PFIZER) Purple Cap Monovalent 03/10/2021, 04/07/2021    MMR 03/14/2014    Tdap 02/06/2019       Results Review:   - I personally reviewed the pts imaging from CT scan of the chest from November 2023 showed a 9 mm left upper lobe lingula nodule, there is also a small 3 mm right lower lobe nodule.  The 9 mm nodule is new, the 3 mm right lower lobe nodule was previously present in 2020.  - I personally reviewed the pts PFT from 12/5/2023 shows no obstruction or restriction with a normal DLCO.  - I personally reviewed the pts Echo report from January 2020 showed an EF of 61%, with normal left ventricular systolic function.  - I personally reviewed the " pts chart with regards to evaluation with the patient's PCP    Assessment / Plan:   Diagnoses and all orders for this visit:    1. 9 mm lingula nodule (November 2023) (Primary)  -I reviewed the patient's CT imaging with her.  The left upper lobe nodule is definitely new.  I discussed with her that we could proceed with a biopsy, PET scan, or a close follow-up in 3 months.  After explaining the risk and benefits of each of them.  She decided that she would be okay to continue with the 3-month follow-up which I think is probably the most appropriate.  Unfortunately the PET scan given the size and the high likelihood of this being a benign process is very likely to give us a nonpositive exam, and I would still recommend we follow it even if the PET scan is negative.  We will be able to do the next CT scan under navigational protocol that way if we do need to proceed with a biopsy we have already had the CT scan in place.  She understands that if there is growth I will recommend that we biopsy it.  I have ordered the CT scan to be done 3 months from now.      Follow Up:   Return in about 3 months (around 3/5/2024) for CT Chest with next visit.     BELEM Washington, DO  Pulmonary and Critical Care Medicine  Note Electronically Signed    Part of this note may be an electronic transcription/translation of spoken language to printed text using the Dragon Dictation System.

## 2025-04-30 ENCOUNTER — OFFICE VISIT (OUTPATIENT)
Age: 35
End: 2025-04-30
Payer: COMMERCIAL

## 2025-04-30 DIAGNOSIS — F43.10 POST TRAUMATIC STRESS DISORDER (PTSD): Primary | ICD-10-CM
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
## 2025-05-23 DIAGNOSIS — K21.9 LARYNGITIS DUE TO GASTROESOPHAGEAL REFLUX: ICD-10-CM

## 2025-05-23 DIAGNOSIS — J04.0 LARYNGITIS DUE TO GASTROESOPHAGEAL REFLUX: ICD-10-CM

## 2025-05-23 RX ORDER — CETIRIZINE HYDROCHLORIDE, PSEUDOEPHEDRINE HYDROCHLORIDE 5; 120 MG/1; MG/1
1 TABLET, FILM COATED, EXTENDED RELEASE ORAL 2 TIMES DAILY
Qty: 60 TABLET | Refills: 11 | Status: SHIPPED | OUTPATIENT
Start: 2025-05-23

## 2025-06-11 ENCOUNTER — OFFICE VISIT (OUTPATIENT)
Age: 35
End: 2025-06-11
Payer: COMMERCIAL

## 2025-06-11 DIAGNOSIS — F43.10 POST TRAUMATIC STRESS DISORDER (PTSD): Primary | ICD-10-CM

## 2025-06-12 NOTE — PROGRESS NOTES
Progress Note     Date: 06/11/2025  Client Name: Louise Colon  MRN: [id number]  Start Time:    2:00 PM    End Time:    2:59 PM      Diagnoses and all orders for this visit:    1. Post traumatic stress disorder (PTSD) (Primary)         Active Symptoms/Chief Complainant:   Negative self-talk, hypervigilance, anxious thought patterns, flashbacks, self-doubt, feelings of guilt/shame, difficulty sleeping due to racing thoughts, intrusive thoughts related to key moments of trauma         MENTAL STATUS EXAM   General Appearance:  Cleanly groomed and dressed  Eye Contact:  Good eye contact  Attitude:  Cooperative  Motor Activity:  Normal gait, posture  Speech:  Normal rate, tone, volume  Mood and affect:  Appropriate and mood congruent  Thought Process:  Logical and linear  Associations/ Thought Content:  No delusions  Hallucinations:  None  Suicidal Ideations:  Not present  Homicidal Ideation:  Not present  Sensorium:  Alert and clear  Orientation:  Person, place and time  Fund of Knowledge:  Appropriate for age and educational level  Insight:  Good  Judgement:  Good  Reliability:  Good       Risk to self:  Low  No new reported incidents of SI and/or self harm    Risk others:  Low  No new reported incidents of HI and/or aggressive behavior    Progress Note Intervention/Client Response     Progress Note Intervention:     Met with client at [facility] for individual session.     Utilized MI techniques of OARS and providing empathy to explore current stressors and assess sx burden. Clinician continued to build rapport with client during session utilizing motivational interviewing and empathic listening. Utilized MI goal setting techniques to establish treatment goals and complete care plan in collaboration with client.     Utilized CBT reflective listening techniques in order to process anxious thought patterns and interpersonal dynamics. Prompted client to identify related thoughts, feelings, and  behaviors using cognitive triangle model. Provided psychoeducation on cognitive distortions and prompted client to identify most used cognitive distortions. Utilized CBT cognitive restructuring techniques in order to reframe automatic negative thoughts.       Client response:     Client was receptive to MI and CBT intervention. Client was able to identify goals for therapy and was actively engaged in treatment planning process.  Client began processing anxious thought patterns and interpersonal dynamics with clinician. Client engaged cognitive triangle model by identifying related thoughts, feelings, and behaviors. Client demonstrated understanding of cognitive distortions by identifying personal examples of cognitive distortions. Client was receptive to cognitive restructuring techniques to begin reframing automatic negative thoughts.    Client engaged in active discussion with therapist and appeared receptive to therapeutic intervention/clinician feedback.       Follow-up:    At next session, clinician will continue CBT intervention.    New plan of care was created and entered today with the client.  Too soon to assess any type of progress due to new plan being entered this date.  Ongoing treatment is still needed for this client due to newly established goals and client's symptomology not being resolved.         [facility] [id number]

## 2025-06-12 NOTE — TREATMENT PLAN
"Multi-Disciplinary Problems (from Behavioral Health Treatment Plan)      Active Problems       Problem: Trauma and Stressor Related Disorders  Start Date: 06/11/25      Problem Details: Problem Statement:     Client is struggling with Negative self-talk, hypervigilance, anxious thought patterns, flashbacks, self-doubt, feelings of guilt/shame, difficulty sleeping due to racing thoughts, intrusive thoughts related to key moments of trauma     Plan Discharge:    Services will be discontinued upon completion of treatment goals, 45 days without services, 3 No Shows in a 12-month period, or Client report services are no longer needed.          Goal Priority Start Date Expected End Date End Date    Patient will process and move through trauma in a way that improves self regard and the patients ability to function optimally in the world around them. High 06/11/25 12/11/25 --    Goal Details: Goal:     \"I want to have a better way of communicating and work on worrying.\"     Objective:    Over the next 90 days, I will learn and utilize at least 3 coping skills to improve communication skills and cope better with stressors, as measured by Client reports and reductions in the PHQ-9 and VIPUL 7.       Progress toward goal:  Not appropriate to rate progress toward goal since this is the initial treatment plan.        Goal Intervention Frequency Start Date End Date    Assist patient in identifying ways that trauma has negatively impacted their view of themselves and the world. Q2 Weeks 06/11/25 --    Intervention Details: Duration of treatment until remission of symptoms.        Goal Intervention Frequency Start Date End Date    Process trauma in the context of the safe session environment. Q2 Weeks 06/11/25 --    Intervention Details: Duration of treatment until remission of symptoms.        Goal Intervention Frequency Start Date End Date    Develop a plan of behavior changes that will reduce the stress of the trauma. Q2 Weeks " 06/11/25 --    Intervention Details: Duration of treatment until remission of symptoms.                        Reviewed By       Constance Morales LCSW 06/12/25 0775                     I have discussed and reviewed this treatment plan with the patient.

## 2025-06-12 NOTE — PLAN OF CARE
"Goal:     \"I want to have a better way of communicating and work on worrying.\"     Objective:    Over the next 90 days, I will learn and utilize at least 3 coping skills to improve communication skills and cope better with stressors, as measured by Client reports and reductions in the PHQ-9 and VIPUL 7.       Progress toward goal:  Not appropriate to rate progress toward goal since this is the initial treatment plan.  "

## 2025-06-15 DIAGNOSIS — J04.0 LARYNGITIS DUE TO GASTROESOPHAGEAL REFLUX: ICD-10-CM

## 2025-06-15 DIAGNOSIS — K21.9 LARYNGITIS DUE TO GASTROESOPHAGEAL REFLUX: ICD-10-CM

## 2025-06-19 RX ORDER — OMEPRAZOLE 40 MG/1
40 CAPSULE, DELAYED RELEASE ORAL DAILY
Qty: 90 CAPSULE | Refills: 2 | OUTPATIENT
Start: 2025-06-19

## 2025-06-24 ENCOUNTER — HOSPITAL ENCOUNTER (OUTPATIENT)
Dept: CT IMAGING | Facility: HOSPITAL | Age: 35
Discharge: HOME | End: 2025-06-24
Admitting: INTERNAL MEDICINE
Payer: COMMERCIAL

## 2025-06-24 DIAGNOSIS — R91.1 LUNG NODULE: ICD-10-CM

## 2025-06-24 PROCEDURE — 71250 CT THORAX DX C-: CPT

## 2025-06-24 RX ORDER — OMEPRAZOLE 40 MG/1
40 CAPSULE, DELAYED RELEASE ORAL DAILY
Qty: 90 CAPSULE | Refills: 3 | Status: SHIPPED | OUTPATIENT
Start: 2025-06-24

## 2025-06-25 ENCOUNTER — OFFICE VISIT (OUTPATIENT)
Age: 35
End: 2025-06-25
Payer: COMMERCIAL

## 2025-06-25 ENCOUNTER — OFFICE VISIT (OUTPATIENT)
Dept: PULMONOLOGY | Facility: CLINIC | Age: 35
End: 2025-06-25
Payer: COMMERCIAL

## 2025-06-25 VITALS
BODY MASS INDEX: 31.92 KG/M2 | DIASTOLIC BLOOD PRESSURE: 82 MMHG | HEART RATE: 109 BPM | SYSTOLIC BLOOD PRESSURE: 114 MMHG | WEIGHT: 187 LBS | HEIGHT: 64 IN | OXYGEN SATURATION: 98 %

## 2025-06-25 DIAGNOSIS — R91.1 LUNG NODULE: Primary | ICD-10-CM

## 2025-06-25 DIAGNOSIS — F43.10 POST TRAUMATIC STRESS DISORDER (PTSD): Primary | ICD-10-CM

## 2025-06-25 RX ORDER — PHENTERMINE HYDROCHLORIDE 37.5 MG/1
CAPSULE ORAL
COMMUNITY
Start: 2025-05-28

## 2025-06-25 NOTE — PROGRESS NOTES
"Follow Up Office Note       Patient Name: Louise Colon    Referring Physician: No ref. provider found    Chief Complaint:    Chief Complaint   Patient presents with    Post CT     F/u       History of Present Illness: Louise Colon is a 34 y.o. female who is here today to follow-up care with Pulmonary.  Patient with a past medical history significant for migraines.  Patient states he is doing well no pulmonary complaints at this time.    Review of Systems:   Review of Systems   Constitutional:  Negative for chills, fatigue and fever.   HENT:  Negative for congestion and voice change.    Eyes:  Negative for blurred vision.   Respiratory:  Negative for cough, shortness of breath and wheezing.    Cardiovascular:  Negative for chest pain.   Skin:  Negative for dry skin.   Hematological:  Negative for adenopathy.   Psychiatric/Behavioral:  Negative for agitation and depressed mood.        The following portions of the patient's history were reviewed and updated as appropriate: allergies, current medications, past family history, past medical history, past social history, past surgical history and problem list.    Physical Exam:  Vital Signs:   Vitals:    06/25/25 0856   BP: 114/82   BP Location: Left arm   Patient Position: Sitting   Cuff Size: Adult   Pulse: 109   SpO2: 98%   Weight: 84.8 kg (187 lb)   Height: 162.6 cm (64.02\")         Physical Exam  Vitals and nursing note reviewed.   Constitutional:       General: She is not in acute distress.     Appearance: She is well-developed and normal weight. She is not ill-appearing or toxic-appearing.   HENT:      Head: Normocephalic and atraumatic.   Cardiovascular:      Rate and Rhythm: Normal rate and regular rhythm.      Pulses: Normal pulses.      Heart sounds: Normal heart sounds. No murmur heard.     No friction rub. No gallop.   Pulmonary:      Effort: Pulmonary effort is normal. No respiratory distress.      Breath sounds: Normal breath sounds. No wheezing, rhonchi " or rales.   Musculoskeletal:      Right lower leg: No edema.      Left lower leg: No edema.   Skin:     General: Skin is warm and dry.   Neurological:      Mental Status: She is alert and oriented to person, place, and time.         Immunization History   Administered Date(s) Administered    COVID-19 (PFIZER) Purple Cap Monovalent 03/10/2021, 04/07/2021    MMR 03/14/2014    Tdap 02/06/2019       Results Review:   - I personally reviewed the pts imaging from CT scan of the chest from 6/25/2025 which showed 9 mm nodule in the lingula that is stable and multiple other scattered small sub-5 mm nodules also stable.  - CT scan of the chest from 2/22/2024 showed mild enlargement and a still 9 mm lingula nodule, multiple other small scattered sub-5 mm nodules also seen.  - CT scan of the chest from November 2023 showed a 9 mm left upper lobe lingula nodule, there is also a small 3 mm right lower lobe nodule.  The 9 mm nodule is new, the 3 mm right lower lobe nodule was previously present in 2020.  - PFT from 12/5/2023 shows no obstruction or restriction with a normal DLCO.  - Echo report from January 2020 showed an EF of 61%, with normal left ventricular systolic function.    Assessment / Plan:   Diagnoses and all orders for this visit:    1. 9 mm lingula nodule (November 2023) (Primary)  - The 9 mm lingula nodule has been stable since 2023.  This is a benign lesion no further imaging is required.  The smaller nodules do not need any further follow-up.  She is low risk for any malignancy.          Follow Up:   Return if symptoms worsen or fail to improve.       BELEM [place],   Pulmonary and Critical Care Medicine  Note Electronically Signed    Part of this note may be an electronic transcription/translation of spoken language to printed text using the Dragon Dictation System.

## 2025-06-28 NOTE — PROGRESS NOTES
Progress Note     Date: 06/25/2025   Client Name: Louise Colon   MRN: [id number]   Start Time:    11:01 AM    End Time:    12:02 PM    Diagnoses and all orders for this visit:    1. Post traumatic stress disorder (PTSD) (Primary)        Active Symptoms/Chief Complainant:   Negative self-talk, hypervigilance, anxious thought patterns, flashbacks, self-doubt, feelings of guilt/shame, difficulty sleeping due to racing thoughts, intrusive thoughts related to key moments of trauma         MENTAL STATUS EXAM   General Appearance:  Cleanly groomed and dressed  Eye Contact:  Good eye contact  Attitude:  Cooperative  Motor Activity:  Normal gait, posture  Speech:  Normal rate, tone, volume  Mood and affect:  Appropriate and mood congruent  Thought Process:  Logical and linear  Associations/ Thought Content:  No delusions  Hallucinations:  None  Suicidal Ideations:  Not present  Homicidal Ideation:  Not present  Sensorium:  Alert and clear  Orientation:  Person, place and time  Fund of Knowledge:  Appropriate for age and educational level  Insight:  Good  Judgement:  Good  Reliability:  Good       Risk to self:  Low  No new reported incidents of SI and/or self harm    Risk to others:  Low  No new reported incidents of HI and/or aggressive behaviors    Progress Note Intervention     Progress Note Intervention:     Met with client at [facility] for individual session.     Utilized MI techniques of OARS and providing empathy to explore current stressors and assess sx burden. Clinician continued to build rapport with client during session utilizing motivational interviewing and empathic listening.     Utilized CBT reflective listening techniques in order to process feelings of shame and patterns of negative self talk. Explored how past experiences of trauma have impacted patterns of negative self talk. Reviewed cognitive distortions with client and discussed ways that  client has engaged cognitive distortions  since last session. Utilized CBT cognitive restructuring techniques in order to challenge and reframe automatic negative thoughts.      Client response:     Client was receptive to MI and CBT intervention. Client processed feelings of shame and patterns of negative self talk. Client explored ways in which past experiences of trauma have shaped self talk patterns. Client reviewed cognitive distortions with clinician and was able to identify times that she has used cognitive distortions since last session. Client was receptive to cognitive restructuring techniques to challenge and reframe automatic negative thoughts.    Client engaged in active discussion with therapist and appeared receptive to therapeutic intervention/clinician feedback.       Follow-up:    At next session, clinician will continue CBT intervention.    Client reported making some progress on current care plan.  Ongoing treatment is still needed for this client due to goals not being fully realized or symptomology not being resolved.         [facility] [address]

## 2025-08-06 ENCOUNTER — OFFICE VISIT (OUTPATIENT)
Age: 35
End: 2025-08-06
Payer: COMMERCIAL

## 2025-08-06 DIAGNOSIS — F43.10 POST TRAUMATIC STRESS DISORDER (PTSD): Primary | ICD-10-CM

## 2025-08-20 ENCOUNTER — OFFICE VISIT (OUTPATIENT)
Age: 35
End: 2025-08-20
Payer: COMMERCIAL

## 2025-08-20 DIAGNOSIS — F43.10 POST TRAUMATIC STRESS DISORDER (PTSD): Primary | ICD-10-CM
